# Patient Record
Sex: FEMALE | Race: WHITE | URBAN - METROPOLITAN AREA
[De-identification: names, ages, dates, MRNs, and addresses within clinical notes are randomized per-mention and may not be internally consistent; named-entity substitution may affect disease eponyms.]

---

## 2020-11-04 ENCOUNTER — OFFICE VISIT (OUTPATIENT)
Dept: URBAN - METROPOLITAN AREA CLINIC 68 | Facility: CLINIC | Age: 55
End: 2020-11-04

## 2020-11-23 ENCOUNTER — OFFICE VISIT (OUTPATIENT)
Dept: URBAN - METROPOLITAN AREA CLINIC 68 | Facility: CLINIC | Age: 55
End: 2020-11-23

## 2020-12-03 ENCOUNTER — TELEPHONE ENCOUNTER (OUTPATIENT)
Dept: URBAN - METROPOLITAN AREA CLINIC 68 | Facility: CLINIC | Age: 55
End: 2020-12-03

## 2020-12-09 ENCOUNTER — OFFICE VISIT (OUTPATIENT)
Dept: URBAN - METROPOLITAN AREA SURGERY CENTER 12 | Facility: SURGERY CENTER | Age: 55
End: 2020-12-09

## 2020-12-10 ENCOUNTER — OFFICE VISIT (OUTPATIENT)
Dept: URBAN - METROPOLITAN AREA SURGERY CENTER 12 | Facility: SURGERY CENTER | Age: 55
End: 2020-12-10

## 2020-12-15 ENCOUNTER — OFFICE VISIT (OUTPATIENT)
Dept: URBAN - METROPOLITAN AREA CLINIC 68 | Facility: CLINIC | Age: 55
End: 2020-12-15

## 2020-12-24 ENCOUNTER — OFFICE VISIT (OUTPATIENT)
Dept: URBAN - METROPOLITAN AREA SURGERY CENTER 12 | Facility: SURGERY CENTER | Age: 55
End: 2020-12-24

## 2020-12-28 LAB
01: (no result)
01: (no result)

## 2020-12-30 ENCOUNTER — TELEPHONE ENCOUNTER (OUTPATIENT)
Dept: URBAN - METROPOLITAN AREA CLINIC 68 | Facility: CLINIC | Age: 55
End: 2020-12-30

## 2020-12-30 ENCOUNTER — LAB OUTSIDE AN ENCOUNTER (OUTPATIENT)
Dept: URBAN - METROPOLITAN AREA CLINIC 68 | Facility: CLINIC | Age: 55
End: 2020-12-30

## 2021-02-22 ENCOUNTER — TELEPHONE ENCOUNTER (OUTPATIENT)
Dept: URBAN - METROPOLITAN AREA CLINIC 68 | Facility: CLINIC | Age: 56
End: 2021-02-22

## 2022-06-04 ENCOUNTER — TELEPHONE ENCOUNTER (OUTPATIENT)
Dept: URBAN - METROPOLITAN AREA CLINIC 68 | Facility: CLINIC | Age: 57
End: 2022-06-04

## 2022-06-04 RX ORDER — ALBUTEROL SULFATE 90 UG/1
ALBUTEROL SULFATE HFA( 108 (90 BASE)MCG/ACT INHALATION  AS NEEDED ) INACTIVE -HX ENTRY AEROSOL, METERED RESPIRATORY (INHALATION) AS NEEDED
OUTPATIENT
Start: 2019-09-26

## 2022-06-04 RX ORDER — POLYETHYLENE GLYCOL 3350 17 G/DOSE
POWDER (GRAM) ORAL
Qty: 1 | Refills: 0 | OUTPATIENT
Start: 2018-04-10 | End: 2018-04-24

## 2022-06-04 RX ORDER — CLOPIDOGREL BISULFATE 75 MG
PLAVIX( 75MG ORAL  DAILY ) INACTIVE -HX ENTRY TABLET ORAL DAILY
OUTPATIENT
Start: 2019-03-06

## 2022-06-04 RX ORDER — LIRAGLUTIDE 6 MG/ML
SAXENDA( 18MG/3ML SUBCUTANEOUS  AS DIRECTED ) INACTIVE -HX ENTRY INJECTION, SOLUTION SUBCUTANEOUS AS DIRECTED
OUTPATIENT
Start: 2019-09-26

## 2022-06-04 RX ORDER — POLYETHYLENE GLYCOL 3350, SODIUM SULFATE ANHYDROUS, SODIUM BICARBONATE, SODIUM CHLORIDE, POTASSIUM CHLORIDE 236; 22.74; 6.74; 5.86; 2.97 G/4L; G/4L; G/4L; G/4L; G/4L
POWDER, FOR SOLUTION ORAL ONCE
Qty: 1 | Refills: 0 | OUTPATIENT
Start: 2018-04-10 | End: 2018-04-11

## 2022-06-04 RX ORDER — CLOPIDOGREL BISULFATE 75 MG
PLAVIX( 75MG ORAL 1 DAILY ) INACTIVE -HX ENTRY TABLET ORAL DAILY
OUTPATIENT
Start: 2019-09-26

## 2022-06-04 RX ORDER — CARVEDILOL 25 MG/1
CARVEDILOL( 25MG ORAL  BID ) INACTIVE -HX ENTRY TABLET, FILM COATED ORAL BID
OUTPATIENT
Start: 2019-03-06

## 2022-06-04 RX ORDER — RIFAXIMIN 550 MG/1
TABLET ORAL
Qty: 42 | Refills: 0 | OUTPATIENT
Start: 2020-12-15 | End: 2020-12-29

## 2022-06-04 RX ORDER — DOXYCYCLINE 100 MG/10ML
DOXYCYCLINE HYCLATE( 100MG INTRAVENOUS  DAILY ) INACTIVE -HX ENTRY INJECTION, POWDER, LYOPHILIZED, FOR SOLUTION INTRAVENOUS DAILY
OUTPATIENT
Start: 2014-02-03

## 2022-06-04 RX ORDER — SODIUM SULFATE, POTASSIUM SULFATE, MAGNESIUM SULFATE 17.5; 3.13; 1.6 G/ML; G/ML; G/ML
SOLUTION, CONCENTRATE ORAL AS DIRECTED
Qty: 1 | Refills: 0 | OUTPATIENT
Start: 2020-12-15 | End: 2020-12-16

## 2022-06-04 RX ORDER — PROMETHAZINE HYDROCHLORIDE 25 MG/1
TABLET ORAL
Qty: 20 | Refills: 0 | OUTPATIENT
Start: 2018-04-10 | End: 2018-04-15

## 2022-06-04 RX ORDER — ESTRADIOL 0.05 MG/D
ESTRADIOL( 0.05MG/24HR TRANSDERMAL  TWO TIMES A WEEK ) INACTIVE -HX ENTRY PATCH TRANSDERMAL
OUTPATIENT
Start: 2018-04-10

## 2022-06-04 RX ORDER — LINACLOTIDE 72 UG/1
CAPSULE, GELATIN COATED ORAL DAILY
Qty: 14 | Refills: 0 | OUTPATIENT
Start: 2019-03-07 | End: 2019-03-21

## 2022-06-04 RX ORDER — GABAPENTIN 300 MG/1
GABAPENTIN( 300MG ORAL  THREE TIMES DAILY ) INACTIVE -HX ENTRY CAPSULE ORAL
OUTPATIENT
Start: 2018-04-10

## 2022-06-04 RX ORDER — LISINOPRIL 5 MG/1
LISINOPRIL( 5MG ORAL  1/2 TAB ) INACTIVE -HX ENTRY TABLET ORAL
OUTPATIENT
Start: 2019-03-06

## 2022-06-04 RX ORDER — FAMOTIDINE 20 MG/1
FAMOTIDINE( 20MG ORAL 1 DAILY ) INACTIVE -HX ENTRY TABLET, FILM COATED ORAL DAILY
OUTPATIENT
Start: 2019-09-26

## 2022-06-05 ENCOUNTER — TELEPHONE ENCOUNTER (OUTPATIENT)
Dept: URBAN - METROPOLITAN AREA CLINIC 68 | Facility: CLINIC | Age: 57
End: 2022-06-05

## 2022-06-05 RX ORDER — PAROXETINE HYDROCHLORIDE 10 MG/1
PAROXETINE HCL( 10MG ORAL 1 DAILY ) ACTIVE -HX ENTRY TABLET ORAL DAILY
Status: ACTIVE | COMMUNITY
Start: 2020-12-15

## 2022-06-05 RX ORDER — ROSUVASTATIN CALCIUM 5 MG/1
ROSUVASTATIN CALCIUM( 5MG ORAL 1 DAILY ) ACTIVE -HX ENTRY TABLET, FILM COATED ORAL DAILY
Status: ACTIVE | COMMUNITY
Start: 2020-12-15

## 2022-06-05 RX ORDER — MONTELUKAST SODIUM 10 MG/1
SINGULAIR( 10MG ORAL  DAILY ) ACTIVE -HX ENTRY TABLET, FILM COATED ORAL DAILY
Status: ACTIVE | COMMUNITY
Start: 2020-12-15

## 2022-06-05 RX ORDER — SILODOSIN 8 MG/1
RAPAFLO( 8MG ORAL   ) ACTIVE -HX ENTRY CAPSULE ORAL
Status: ACTIVE | COMMUNITY
Start: 2020-12-15

## 2022-06-05 RX ORDER — ETHAMBUTOL HYDROCHLORIDE 400 MG/1
ETHAMBUTOL HCL( 400MG ORAL  DAILY ) ACTIVE -HX ENTRY TABLET ORAL DAILY
Status: ACTIVE | COMMUNITY
Start: 2020-12-15

## 2022-06-05 RX ORDER — AZITHROMYCIN 500 MG/1
AZITHROMYCIN( 500MG ORAL   ) ACTIVE -HX ENTRY TABLET, FILM COATED ORAL
Status: ACTIVE | COMMUNITY
Start: 2020-12-15

## 2022-06-05 RX ORDER — PANTOPRAZOLE SODIUM 40 MG/1
TABLET, DELAYED RELEASE ORAL DAILY
Qty: 90 | Refills: 90 | Status: ACTIVE | COMMUNITY
Start: 2020-11-13

## 2022-06-05 RX ORDER — OLMESARTAN MEDOXOMIL 5 MG/1
OLMESARTAN MEDOXOMIL( 5MG ORAL 1 DAILY ) ACTIVE -HX ENTRY TABLET, FILM COATED ORAL DAILY
Status: ACTIVE | COMMUNITY
Start: 2020-12-15

## 2022-06-05 RX ORDER — RIFAMPIN 300 MG/1
RIFAMPIN( 300MG ORAL  DAILY ) ACTIVE -HX ENTRY CAPSULE ORAL DAILY
Status: ACTIVE | COMMUNITY
Start: 2020-12-15

## 2022-06-05 RX ORDER — MONTELUKAST SODIUM 10 MG/1
MONTELUKAST SODIUM( 10MG ORAL  DAILY ) ACTIVE -HX ENTRY TABLET, FILM COATED ORAL DAILY
Status: ACTIVE | COMMUNITY
Start: 2020-12-15

## 2022-06-05 RX ORDER — TIOTROPIUM BROMIDE 18 UG/1
SPIRIVA HANDIHALER( 18MCG INHALATION  AS DIRECTED ) ACTIVE -HX ENTRY CAPSULE ORAL; RESPIRATORY (INHALATION) AS DIRECTED
Status: ACTIVE | COMMUNITY
Start: 2020-12-15

## 2022-06-05 RX ORDER — FLUTICASONE FUROATE 100 UG/1
ARNUITY ELLIPTA( 100MCG/ACT INHALATION  AS DIRECTED ) ACTIVE -HX ENTRY POWDER RESPIRATORY (INHALATION) AS DIRECTED
Status: ACTIVE | COMMUNITY
Start: 2020-12-15

## 2022-06-05 RX ORDER — OMEPRAZOLE 20 MG/1
OMEPRAZOLE( 20MG ORAL  DAILY ) ACTIVE -HX ENTRY CAPSULE, DELAYED RELEASE ORAL DAILY
Status: ACTIVE | COMMUNITY
Start: 2020-12-15

## 2022-06-05 RX ORDER — ALBUTEROL SULFATE 90 UG/1
PROAIR HFA( 108 (90 BASE)MCG/ACT INHALATION  TWO PUFFS DAILY PRN ) ACTIVE -HX ENTRY AEROSOL, METERED RESPIRATORY (INHALATION)
Status: ACTIVE | COMMUNITY
Start: 2020-12-15

## 2022-06-05 RX ORDER — BUSPIRONE HYDROCHLORIDE 5 MG/1
BUSPIRONE HCL( 5MG ORAL   ) ACTIVE -HX ENTRY TABLET ORAL
Status: ACTIVE | COMMUNITY
Start: 2020-12-15

## 2022-06-05 RX ORDER — METFORMIN HYDROCHLORIDE 500 MG/1
METFORMIN HCL( 500MG ORAL   ) ACTIVE -HX ENTRY TABLET, COATED ORAL
Status: ACTIVE | COMMUNITY
Start: 2020-12-15

## 2022-06-05 RX ORDER — PIOGLITAZONE 15 MG/1
PIOGLITAZONE HCL( 15MG ORAL   ) ACTIVE -HX ENTRY TABLET ORAL
Status: ACTIVE | COMMUNITY
Start: 2020-12-15

## 2022-06-05 RX ORDER — NITROGLYCERIN 0.4 MG/1
NITROGLYCERIN( 0.4MG SUBLINGUAL  AS NEEDED ) ACTIVE -HX ENTRY TABLET SUBLINGUAL AS NEEDED
Status: ACTIVE | COMMUNITY
Start: 2020-12-15

## 2022-06-05 RX ORDER — VERAPAMIL HYDROCHLORIDE 80 MG/1
VERAPAMIL HCL( 80MG ORAL  DAILY ) ACTIVE -HX ENTRY TABLET, FILM COATED ORAL DAILY
Status: ACTIVE | COMMUNITY
Start: 2020-12-15

## 2022-06-05 RX ORDER — LISINOPRIL 10 MG/1
LISINOPRIL( 10MG ORAL 1 DAILY ) ACTIVE -HX ENTRY TABLET ORAL DAILY
Status: ACTIVE | COMMUNITY
Start: 2020-12-15

## 2022-06-05 RX ORDER — NITROGLYCERIN 0.1 MG/H
NITROGLYCERIN( 0.1MG/HR TRANSDERMAL  AS NEEDED ) ACTIVE -HX ENTRY PATCH TRANSDERMAL AS NEEDED
Status: ACTIVE | COMMUNITY
Start: 2020-12-15

## 2022-06-05 RX ORDER — ROSUVASTATIN CALCIUM 5 MG
CRESTOR( 5MG ORAL  DAILY ) ACTIVE -HX ENTRY TABLET ORAL DAILY
Status: ACTIVE | COMMUNITY
Start: 2020-12-15

## 2022-06-05 RX ORDER — COLESEVELAM HYDROCHLORIDE 625 MG/1
TABLET, FILM COATED ORAL
Qty: 30 | Refills: 30 | Status: ACTIVE | COMMUNITY
Start: 2020-12-30

## 2022-06-25 ENCOUNTER — TELEPHONE ENCOUNTER (OUTPATIENT)
Age: 57
End: 2022-06-25

## 2022-06-25 RX ORDER — PROMETHAZINE HYDROCHLORIDE 25 MG/1
TABLET ORAL
Qty: 20 | Refills: 0 | OUTPATIENT
Start: 2018-04-10 | End: 2018-04-15

## 2022-06-25 RX ORDER — ALBUTEROL SULFATE 90 UG/1
ALBUTEROL SULFATE HFA( 108 (90 BASE)MCG/ACT INHALATION  AS NEEDED ) INACTIVE -HX ENTRY INHALANT RESPIRATORY (INHALATION) AS NEEDED
OUTPATIENT
Start: 2019-09-26

## 2022-06-25 RX ORDER — METHYLCELLULOSE 500 MG/1
TABLET ORAL DAILY
Qty: 30 | Refills: 0 | OUTPATIENT
Start: 2014-02-03 | End: 2014-03-05

## 2022-06-25 RX ORDER — FAMOTIDINE 20 MG/1
FAMOTIDINE( 20MG ORAL 1 DAILY ) INACTIVE -HX ENTRY TABLET ORAL DAILY
OUTPATIENT
Start: 2019-09-26

## 2022-06-25 RX ORDER — ESTRADIOL 0.05 MG/D
ESTRADIOL( 0.05MG/24HR TRANSDERMAL  TWO TIMES A WEEK ) INACTIVE -HX ENTRY PATCH TRANSDERMAL
OUTPATIENT
Start: 2018-04-10

## 2022-06-25 RX ORDER — POLYETHYLENE GLYCOL 3350, SODIUM SULFATE ANHYDROUS, SODIUM BICARBONATE, SODIUM CHLORIDE, POTASSIUM CHLORIDE 236; 22.74; 6.74; 5.86; 2.97 G/4L; G/4L; G/4L; G/4L; G/4L
POWDER, FOR SOLUTION ORAL ONCE
Qty: 1 | Refills: 0 | OUTPATIENT
Start: 2018-04-10 | End: 2018-04-11

## 2022-06-25 RX ORDER — CLOPIDOGREL BISULFATE 75 MG
PLAVIX( 75MG ORAL 1 DAILY ) INACTIVE -HX ENTRY TABLET ORAL DAILY
OUTPATIENT
Start: 2019-09-26

## 2022-06-25 RX ORDER — RIFAXIMIN 550 MG/1
TABLET ORAL
Qty: 42 | Refills: 0 | OUTPATIENT
Start: 2020-12-15 | End: 2020-12-29

## 2022-06-25 RX ORDER — LORATADINE 5 MG
TABLET,CHEWABLE ORAL
Qty: 1 | Refills: 0 | OUTPATIENT
Start: 2018-04-10 | End: 2018-04-24

## 2022-06-25 RX ORDER — LIRAGLUTIDE 6 MG/ML
SAXENDA( 18MG/3ML SUBCUTANEOUS  AS DIRECTED ) INACTIVE -HX ENTRY INJECTION, SOLUTION SUBCUTANEOUS AS DIRECTED
OUTPATIENT
Start: 2019-09-26

## 2022-06-25 RX ORDER — LISINOPRIL 5 MG/1
LISINOPRIL( 5MG ORAL  1/2 TAB ) INACTIVE -HX ENTRY TABLET ORAL
OUTPATIENT
Start: 2019-03-06

## 2022-06-25 RX ORDER — SODIUM SULFATE, POTASSIUM SULFATE, MAGNESIUM SULFATE 17.5; 3.13; 1.6 G/ML; G/ML; G/ML
SOLUTION, CONCENTRATE ORAL AS DIRECTED
Qty: 1 | Refills: 0 | OUTPATIENT
Start: 2020-12-15 | End: 2020-12-16

## 2022-06-25 RX ORDER — DOXYCYCLINE 100 MG/10ML
DOXYCYCLINE HYCLATE( 100MG INTRAVENOUS  DAILY ) INACTIVE -HX ENTRY INJECTION, POWDER, LYOPHILIZED, FOR SOLUTION INTRAVENOUS DAILY
OUTPATIENT
Start: 2014-02-03

## 2022-06-25 RX ORDER — GABAPENTIN 300 MG/1
GABAPENTIN( 300MG ORAL  THREE TIMES DAILY ) INACTIVE -HX ENTRY CAPSULE ORAL
OUTPATIENT
Start: 2018-04-10

## 2022-06-25 RX ORDER — CLOPIDOGREL BISULFATE 75 MG
PLAVIX( 75MG ORAL  DAILY ) INACTIVE -HX ENTRY TABLET ORAL DAILY
OUTPATIENT
Start: 2019-03-06

## 2022-06-25 RX ORDER — LINACLOTIDE 72 UG/1
CAPSULE, GELATIN COATED ORAL DAILY
Qty: 14 | Refills: 0 | OUTPATIENT
Start: 2019-03-07 | End: 2019-03-21

## 2022-06-25 RX ORDER — CARVEDILOL 25 MG/1
CARVEDILOL( 25MG ORAL  BID ) INACTIVE -HX ENTRY TABLET, FILM COATED ORAL BID
OUTPATIENT
Start: 2019-03-06

## 2022-06-26 ENCOUNTER — TELEPHONE ENCOUNTER (OUTPATIENT)
Age: 57
End: 2022-06-26

## 2022-06-26 RX ORDER — AZITHROMYCIN DIHYDRATE 500 MG/1
AZITHROMYCIN( 500MG ORAL   ) ACTIVE -HX ENTRY TABLET, FILM COATED ORAL
Status: ACTIVE | COMMUNITY
Start: 2020-12-15

## 2022-06-26 RX ORDER — ASPIRIN 81 MG/1
LOW-DOSE ASPIRIN( 81MG ORAL  DAILY ) ACTIVE -HX ENTRY TABLET, COATED ORAL DAILY
Status: ACTIVE | COMMUNITY
Start: 2020-12-15

## 2022-06-26 RX ORDER — LISINOPRIL 10 MG/1
LISINOPRIL( 10MG ORAL 1 DAILY ) ACTIVE -HX ENTRY TABLET ORAL DAILY
Status: ACTIVE | COMMUNITY
Start: 2020-12-15

## 2022-06-26 RX ORDER — METFORMIN HCL 500 MG/1
METFORMIN HCL( 500MG ORAL   ) ACTIVE -HX ENTRY TABLET ORAL
Status: ACTIVE | COMMUNITY
Start: 2020-12-15

## 2022-06-26 RX ORDER — OLMESARTAN MEDOXOMIL 5 MG/1
OLMESARTAN MEDOXOMIL( 5MG ORAL 1 DAILY ) ACTIVE -HX ENTRY TABLET, FILM COATED ORAL DAILY
Status: ACTIVE | COMMUNITY
Start: 2020-12-15

## 2022-06-26 RX ORDER — PANTOPRAZOLE 40 MG/1
TABLET, DELAYED RELEASE ORAL DAILY
Qty: 90 | Refills: 90 | Status: ACTIVE | COMMUNITY
Start: 2020-11-13

## 2022-06-26 RX ORDER — TIOTROPIUM BROMIDE 18 UG/1
SPIRIVA HANDIHALER( 18MCG INHALATION  AS DIRECTED ) ACTIVE -HX ENTRY CAPSULE ORAL; RESPIRATORY (INHALATION) AS DIRECTED
Status: ACTIVE | COMMUNITY
Start: 2020-12-15

## 2022-06-26 RX ORDER — COLESEVELAM HYDROCHLORIDE 625 MG/1
TABLET, COATED ORAL
Qty: 30 | Refills: 30 | Status: ACTIVE | COMMUNITY
Start: 2020-12-30

## 2022-06-26 RX ORDER — VERAPAMIL HCL 80 MG
VERAPAMIL HCL( 80MG ORAL  DAILY ) ACTIVE -HX ENTRY TABLET ORAL DAILY
Status: ACTIVE | COMMUNITY
Start: 2020-12-15

## 2022-06-26 RX ORDER — PAROXETINE HYDROCHLORIDE 10 MG/1
PAROXETINE HCL( 10MG ORAL 1 DAILY ) ACTIVE -HX ENTRY TABLET ORAL DAILY
Status: ACTIVE | COMMUNITY
Start: 2020-12-15

## 2022-06-26 RX ORDER — NITROGLYCERIN 0.4 MG/1
NITROGLYCERIN( 0.4MG SUBLINGUAL  AS NEEDED ) ACTIVE -HX ENTRY TABLET SUBLINGUAL AS NEEDED
Status: ACTIVE | COMMUNITY
Start: 2020-12-15

## 2022-06-26 RX ORDER — ROSUVASTATIN CALCIUM 5 MG
CRESTOR( 5MG ORAL  DAILY ) ACTIVE -HX ENTRY TABLET ORAL DAILY
Status: ACTIVE | COMMUNITY
Start: 2020-12-15

## 2022-06-26 RX ORDER — DICLOFENAC SODIUM 1 %
VOLTAREN( 1% EXTERNAL  AS NEEDED ) ACTIVE -HX ENTRY GEL (GRAM) TOPICAL AS NEEDED
Status: ACTIVE | COMMUNITY
Start: 2020-12-15

## 2022-06-26 RX ORDER — FLUTICASONE FUROATE 100 UG/1
ARNUITY ELLIPTA( 100MCG/ACT INHALATION  AS DIRECTED ) ACTIVE -HX ENTRY POWDER RESPIRATORY (INHALATION) AS DIRECTED
Status: ACTIVE | COMMUNITY
Start: 2020-12-15

## 2022-06-26 RX ORDER — BUSPIRONE HYDROCHLORIDE 5 MG/1
BUSPIRONE HCL( 5MG ORAL   ) ACTIVE -HX ENTRY TABLET ORAL
Status: ACTIVE | COMMUNITY
Start: 2020-12-15

## 2022-06-26 RX ORDER — PIOGLITAZONE 15 MG/1
PIOGLITAZONE HCL( 15MG ORAL   ) ACTIVE -HX ENTRY TABLET ORAL
Status: ACTIVE | COMMUNITY
Start: 2020-12-15

## 2022-06-26 RX ORDER — MONTELUKAST SODIUM 10 MG/1
SINGULAIR( 10MG ORAL  DAILY ) ACTIVE -HX ENTRY TABLET, FILM COATED ORAL DAILY
Status: ACTIVE | COMMUNITY
Start: 2020-12-15

## 2022-06-26 RX ORDER — NITROGLYCERIN 20 MG/1
NITROGLYCERIN( 0.1MG/HR TRANSDERMAL  AS NEEDED ) ACTIVE -HX ENTRY PATCH TRANSDERMAL AS NEEDED
Status: ACTIVE | COMMUNITY
Start: 2020-12-15

## 2022-06-26 RX ORDER — ROSUVASTATIN CALCIUM 5 MG/1
ROSUVASTATIN CALCIUM( 5MG ORAL 1 DAILY ) ACTIVE -HX ENTRY TABLET, FILM COATED ORAL DAILY
Status: ACTIVE | COMMUNITY
Start: 2020-12-15

## 2022-06-26 RX ORDER — RIFAMPIN 300 MG/1
RIFAMPIN( 300MG ORAL  DAILY ) ACTIVE -HX ENTRY CAPSULE ORAL DAILY
Status: ACTIVE | COMMUNITY
Start: 2020-12-15

## 2022-06-26 RX ORDER — VALSARTAN 80 MG/1
VALSARTAN( 80MG ORAL  DAILY ) ACTIVE -HX ENTRY TABLET, COATED ORAL DAILY
Status: ACTIVE | COMMUNITY
Start: 2020-12-15

## 2022-06-26 RX ORDER — SILODOSIN 8 MG/1
RAPAFLO( 8MG ORAL   ) ACTIVE -HX ENTRY CAPSULE ORAL
Status: ACTIVE | COMMUNITY
Start: 2020-12-15

## 2022-06-26 RX ORDER — OMEPRAZOLE 20 MG/1
OMEPRAZOLE( 20MG ORAL  DAILY ) ACTIVE -HX ENTRY CAPSULE, DELAYED RELEASE ORAL DAILY
Status: ACTIVE | COMMUNITY
Start: 2020-12-15

## 2022-06-26 RX ORDER — ETHAMBUTOL HYDROCHLORIDE 400 MG/1
ETHAMBUTOL HCL( 400MG ORAL  DAILY ) ACTIVE -HX ENTRY TABLET ORAL DAILY
Status: ACTIVE | COMMUNITY
Start: 2020-12-15

## 2022-06-26 RX ORDER — MONTELUKAST 10 MG/1
MONTELUKAST SODIUM( 10MG ORAL  DAILY ) ACTIVE -HX ENTRY TABLET, FILM COATED ORAL DAILY
Status: ACTIVE | COMMUNITY
Start: 2020-12-15

## 2022-07-09 ENCOUNTER — TELEPHONE ENCOUNTER (OUTPATIENT)
Dept: URBAN - METROPOLITAN AREA CLINIC 121 | Facility: CLINIC | Age: 57
End: 2022-07-09

## 2022-07-10 ENCOUNTER — TELEPHONE ENCOUNTER (OUTPATIENT)
Dept: URBAN - METROPOLITAN AREA CLINIC 121 | Facility: CLINIC | Age: 57
End: 2022-07-10

## 2023-11-07 ENCOUNTER — LAB OUTSIDE AN ENCOUNTER (OUTPATIENT)
Dept: URBAN - METROPOLITAN AREA CLINIC 66 | Facility: CLINIC | Age: 58
End: 2023-11-07

## 2023-11-07 ENCOUNTER — OFFICE VISIT (OUTPATIENT)
Dept: URBAN - METROPOLITAN AREA CLINIC 66 | Facility: CLINIC | Age: 58
End: 2023-11-07
Payer: COMMERCIAL

## 2023-11-07 VITALS
SYSTOLIC BLOOD PRESSURE: 114 MMHG | HEIGHT: 65 IN | WEIGHT: 178 LBS | BODY MASS INDEX: 29.66 KG/M2 | OXYGEN SATURATION: 99 % | HEART RATE: 62 BPM | DIASTOLIC BLOOD PRESSURE: 70 MMHG | TEMPERATURE: 98.1 F

## 2023-11-07 DIAGNOSIS — Z86.010 HISTORY OF COLON POLYPS: ICD-10-CM

## 2023-11-07 DIAGNOSIS — K58.0 IRRITABLE BOWEL SYNDROME WITH DIARRHEA: ICD-10-CM

## 2023-11-07 DIAGNOSIS — R19.7 ACUTE DIARRHEA: ICD-10-CM

## 2023-11-07 DIAGNOSIS — K57.30 DIVERTICULOSIS OF COLON: ICD-10-CM

## 2023-11-07 DIAGNOSIS — R10.33 PERIUMBILICAL ABDOMINAL PAIN: ICD-10-CM

## 2023-11-07 DIAGNOSIS — R12 HEARTBURN: ICD-10-CM

## 2023-11-07 DIAGNOSIS — Z80.0 FH: COLON CANCER: ICD-10-CM

## 2023-11-07 DIAGNOSIS — K29.30 CHRONIC SUPERFICIAL GASTRITIS WITHOUT BLEEDING: ICD-10-CM

## 2023-11-07 PROCEDURE — 99214 OFFICE O/P EST MOD 30 MIN: CPT | Performed by: INTERNAL MEDICINE

## 2023-11-07 RX ORDER — TRAMADOL HYDROCHLORIDE 50 MG/1
TAKE ONE TABLET BY MOUTH EVERY 6 HOURS AS NEEDED FOR SEVERE PAIN (7-10) FOR UP TO THREE DAYS TABLET, FILM COATED ORAL
Qty: 12 UNSPECIFIED | Refills: 0 | Status: ACTIVE | COMMUNITY

## 2023-11-07 RX ORDER — METOPROLOL SUCCINATE 50 MG/1
TAKE ONE TABLET BY MOUTH ONE TIME DAILY DO NOT CRUSH OR CHEW TABLET, EXTENDED RELEASE ORAL
Qty: 90 UNSPECIFIED | Refills: 3 | Status: ACTIVE | COMMUNITY

## 2023-11-07 RX ORDER — ASPIRIN 81 MG/1
LOW-DOSE ASPIRIN( 81MG ORAL  DAILY ) ACTIVE -HX ENTRY TABLET, COATED ORAL DAILY
Status: ACTIVE | COMMUNITY
Start: 2020-12-15

## 2023-11-07 RX ORDER — ALPRAZOLAM 0.5 MG/1
TAKE ONE TABLET BY MOUTH EVERY 8 HOURS AS NEEDED TABLET ORAL
Qty: 90 UNSPECIFIED | Refills: 2 | Status: ACTIVE | COMMUNITY

## 2023-11-07 RX ORDER — ROSUVASTATIN CALCIUM 5 MG/1
ROSUVASTATIN CALCIUM( 5MG ORAL 1 DAILY ) ACTIVE -HX ENTRY TABLET, FILM COATED ORAL DAILY
Status: ACTIVE | COMMUNITY
Start: 2020-12-15

## 2023-11-07 RX ORDER — MONTELUKAST 10 MG/1
MONTELUKAST SODIUM( 10MG ORAL  DAILY ) ACTIVE -HX ENTRY TABLET, FILM COATED ORAL DAILY
Status: ACTIVE | COMMUNITY
Start: 2020-12-15

## 2023-11-07 RX ORDER — NITROGLYCERIN 0.4 MG/1
NITROGLYCERIN( 0.4MG SUBLINGUAL  AS NEEDED ) ACTIVE -HX ENTRY TABLET SUBLINGUAL AS NEEDED
Status: ACTIVE | COMMUNITY
Start: 2020-12-15

## 2023-11-07 RX ORDER — BUDESONIDE, GLYCOPYRROLATE, AND FORMOTEROL FUMARATE 160; 9; 4.8 UG/1; UG/1; UG/1
INHALE TWO PUFFS BY MOUTH TWICE A DAY AS DIRECTED AEROSOL, METERED RESPIRATORY (INHALATION)
Qty: 10.7 UNSPECIFIED | Refills: 0 | Status: ACTIVE | COMMUNITY

## 2023-11-07 NOTE — HPI-TODAY'S VISIT:
Patient evaluated that had recent pelvic surgery (late Sept 2023) with subsequent encarcerated umbilical hernia that required stat surgery.  REferred had post surgical constipation problems that has improved but not compleately resolved.  Referred having intermittent diarrhea. Referred having intermittent episodes of pain.  Denies nausea, vomits, dysphagia, odynophagia, heartburn, abdominal pain, GI bleeding or weight loss

## 2023-11-08 LAB
A/G RATIO: 2.1
ABSOLUTE BASOPHILS: 21
ABSOLUTE EOSINOPHILS: 21
ABSOLUTE LYMPHOCYTES: 707
ABSOLUTE MONOCYTES: 260
ABSOLUTE NEUTROPHILS: 9391
ALBUMIN: 4.6
ALKALINE PHOSPHATASE: 56
ALT (SGPT): 11
AST (SGOT): 16
BASOPHILS: 0.2
BILIRUBIN, TOTAL: 0.6
BUN/CREATININE RATIO: (no result)
BUN: 20
CALCIUM: 10
CARBON DIOXIDE, TOTAL: 31
CHLORIDE: 102
CREATININE: 0.59
EGFR: 104
EOSINOPHILS: 0.2
GLOBULIN, TOTAL: 2.2
GLUCOSE: 82
HEMATOCRIT: 42.7
HEMOGLOBIN: 14.2
LYMPHOCYTES: 6.8
MCH: 28.5
MCHC: 33.3
MCV: 85.6
MONOCYTES: 2.5
MPV: 9.1
NEUTROPHILS: 90.3
PLATELET COUNT: 311
POTASSIUM: 4.4
PROTEIN, TOTAL: 6.8
RDW: 13.3
RED BLOOD CELL COUNT: 4.99
SODIUM: 141
WHITE BLOOD CELL COUNT: 10.4

## 2023-11-10 ENCOUNTER — TELEPHONE ENCOUNTER (OUTPATIENT)
Dept: URBAN - METROPOLITAN AREA CLINIC 68 | Facility: CLINIC | Age: 58
End: 2023-11-10

## 2023-11-10 RX ORDER — ONDANSETRON 4 MG/1
1 TABLET ON THE TONGUE AND ALLOW TO DISSOLVE TABLET, ORALLY DISINTEGRATING ORAL
Qty: 30 TABLET | Refills: 0 | OUTPATIENT
Start: 2023-11-10

## 2023-11-13 ENCOUNTER — TELEPHONE ENCOUNTER (OUTPATIENT)
Dept: URBAN - METROPOLITAN AREA CLINIC 68 | Facility: CLINIC | Age: 58
End: 2023-11-13

## 2023-11-14 LAB — CLOSTRIDIUM DIFFICILE TOXINB,QL REAL TIME PCR: NOT DETECTED

## 2023-12-01 ENCOUNTER — OFFICE VISIT (OUTPATIENT)
Dept: URBAN - METROPOLITAN AREA CLINIC 68 | Facility: CLINIC | Age: 58
End: 2023-12-01
Payer: COMMERCIAL

## 2023-12-01 ENCOUNTER — LAB OUTSIDE AN ENCOUNTER (OUTPATIENT)
Dept: URBAN - METROPOLITAN AREA CLINIC 68 | Facility: CLINIC | Age: 58
End: 2023-12-01

## 2023-12-01 VITALS
SYSTOLIC BLOOD PRESSURE: 132 MMHG | HEIGHT: 65 IN | DIASTOLIC BLOOD PRESSURE: 80 MMHG | WEIGHT: 179 LBS | BODY MASS INDEX: 29.82 KG/M2

## 2023-12-01 DIAGNOSIS — K58.0 IRRITABLE BOWEL SYNDROME WITH DIARRHEA: ICD-10-CM

## 2023-12-01 DIAGNOSIS — M79.3 PANNICULITIS: ICD-10-CM

## 2023-12-01 DIAGNOSIS — R10.33 PERIUMBILICAL ABDOMINAL PAIN: ICD-10-CM

## 2023-12-01 DIAGNOSIS — R19.7 ACUTE DIARRHEA: ICD-10-CM

## 2023-12-01 PROBLEM — 443503005: Status: ACTIVE | Noted: 2023-11-07

## 2023-12-01 PROBLEM — 409966000: Status: ACTIVE | Noted: 2023-11-07

## 2023-12-01 PROCEDURE — 99214 OFFICE O/P EST MOD 30 MIN: CPT

## 2023-12-01 RX ORDER — MONTELUKAST 10 MG/1
MONTELUKAST SODIUM( 10MG ORAL  DAILY ) ACTIVE -HX ENTRY TABLET, FILM COATED ORAL DAILY
Status: ACTIVE | COMMUNITY
Start: 2020-12-15

## 2023-12-01 RX ORDER — BUDESONIDE, GLYCOPYRROLATE, AND FORMOTEROL FUMARATE 160; 9; 4.8 UG/1; UG/1; UG/1
INHALE TWO PUFFS BY MOUTH TWICE A DAY AS DIRECTED AEROSOL, METERED RESPIRATORY (INHALATION)
Qty: 10.7 UNSPECIFIED | Refills: 0 | Status: ACTIVE | COMMUNITY

## 2023-12-01 RX ORDER — TRAMADOL HYDROCHLORIDE 50 MG/1
TAKE ONE TABLET BY MOUTH EVERY 6 HOURS AS NEEDED FOR SEVERE PAIN (7-10) FOR UP TO THREE DAYS TABLET, FILM COATED ORAL
Qty: 12 UNSPECIFIED | Refills: 0 | Status: ACTIVE | COMMUNITY

## 2023-12-01 RX ORDER — CELECOXIB 100 MG/1
1 CAPSULE WITH FOOD CAPSULE ORAL TWICE A DAY
Qty: 28 CAPSULE | Refills: 0 | OUTPATIENT
Start: 2023-12-01 | End: 2023-12-15

## 2023-12-01 RX ORDER — METOPROLOL SUCCINATE 50 MG/1
TAKE ONE TABLET BY MOUTH ONE TIME DAILY DO NOT CRUSH OR CHEW TABLET, EXTENDED RELEASE ORAL
Qty: 90 UNSPECIFIED | Refills: 3 | Status: ACTIVE | COMMUNITY

## 2023-12-01 RX ORDER — ROSUVASTATIN CALCIUM 5 MG/1
ROSUVASTATIN CALCIUM( 5MG ORAL 1 DAILY ) ACTIVE -HX ENTRY TABLET, FILM COATED ORAL DAILY
Status: ACTIVE | COMMUNITY
Start: 2020-12-15

## 2023-12-01 RX ORDER — PANTOPRAZOLE SODIUM 40 MG/1
1 TABLET 30 MINUTES BEFORE MORNING MEAL TABLET, DELAYED RELEASE ORAL ONCE A DAY
Qty: 30 | Refills: 3 | OUTPATIENT
Start: 2023-12-01

## 2023-12-01 RX ORDER — ONDANSETRON 4 MG/1
1 TABLET ON THE TONGUE AND ALLOW TO DISSOLVE TABLET, ORALLY DISINTEGRATING ORAL
Qty: 30 TABLET | Refills: 0 | Status: ACTIVE | COMMUNITY
Start: 2023-11-10

## 2023-12-01 RX ORDER — NITROGLYCERIN 0.4 MG/1
NITROGLYCERIN( 0.4MG SUBLINGUAL  AS NEEDED ) ACTIVE -HX ENTRY TABLET SUBLINGUAL AS NEEDED
Status: ACTIVE | COMMUNITY
Start: 2020-12-15

## 2023-12-01 RX ORDER — ALPRAZOLAM 0.5 MG/1
TAKE ONE TABLET BY MOUTH EVERY 8 HOURS AS NEEDED TABLET ORAL
Qty: 90 UNSPECIFIED | Refills: 2 | Status: ACTIVE | COMMUNITY

## 2023-12-01 RX ORDER — ASPIRIN 81 MG/1
LOW-DOSE ASPIRIN( 81MG ORAL  DAILY ) ACTIVE -HX ENTRY TABLET, COATED ORAL DAILY
Status: ACTIVE | COMMUNITY
Start: 2020-12-15

## 2023-12-01 NOTE — PHYSICAL EXAM GASTROINTESTINAL
Abdomen (Limited by body habitus), multiple clean, dry healing laparoscopic incisions of abdomen. Otherwise, soft, nontender, nondistended, no guarding or rigidity, no masses palpable, normal bowel sounds, Liver and Spleen, no hepatomegaly present, no hepatosplenomegaly, liver nontender, spleen not palpable

## 2023-12-01 NOTE — HPI-TODAY'S VISIT:
Patient presents for follow up of intermittent abdominal pain s/p CT Abd/Pelvis (11/14/23). Refers additionally complete blood work and stool studies. Refers continued loose to diarrheal stools with some accompanying abdominal bloating. Refers uncontrolled heartburn at this time. Refers hx of kidney injury in the past, but states no kidney dysfunction. Refers preceding plevic floor surgery (9/2023) with post-op complication of incarcerated umbilical hernia. Also did experience post-op prolonged constipation. Denies fever, chills, nausea, vomiting, dysphagia, odynophagia, GI bleeding or unintentional weight loss.

## 2024-01-03 ENCOUNTER — WEB ENCOUNTER (OUTPATIENT)
Dept: URBAN - METROPOLITAN AREA CLINIC 68 | Facility: CLINIC | Age: 59
End: 2024-01-03

## 2024-01-03 RX ORDER — CELECOXIB 100 MG/1
1 CAPSULE WITH FOOD CAPSULE ORAL TWICE A DAY
Qty: 28 CAPSULE | Refills: 0
Start: 2023-12-01 | End: 2024-01-17

## 2024-01-04 ENCOUNTER — OFFICE VISIT (OUTPATIENT)
Dept: URBAN - METROPOLITAN AREA CLINIC 67 | Facility: CLINIC | Age: 59
End: 2024-01-04

## 2024-01-10 ENCOUNTER — OFFICE VISIT (OUTPATIENT)
Dept: URBAN - METROPOLITAN AREA CLINIC 66 | Facility: CLINIC | Age: 59
End: 2024-01-10
Payer: COMMERCIAL

## 2024-01-10 ENCOUNTER — LAB OUTSIDE AN ENCOUNTER (OUTPATIENT)
Dept: URBAN - METROPOLITAN AREA CLINIC 66 | Facility: CLINIC | Age: 59
End: 2024-01-10

## 2024-01-10 VITALS
HEIGHT: 65 IN | BODY MASS INDEX: 29.66 KG/M2 | OXYGEN SATURATION: 99 % | HEART RATE: 61 BPM | SYSTOLIC BLOOD PRESSURE: 128 MMHG | DIASTOLIC BLOOD PRESSURE: 82 MMHG | WEIGHT: 178 LBS

## 2024-01-10 DIAGNOSIS — R19.5 LOOSE STOOLS: ICD-10-CM

## 2024-01-10 DIAGNOSIS — R12 HEARTBURN: ICD-10-CM

## 2024-01-10 DIAGNOSIS — K92.1 MELENA: ICD-10-CM

## 2024-01-10 DIAGNOSIS — M79.3 PANNICULITIS: ICD-10-CM

## 2024-01-10 PROBLEM — 2901004: Status: ACTIVE | Noted: 2024-01-10

## 2024-01-10 PROBLEM — 783729004: Status: ACTIVE | Noted: 2024-01-10

## 2024-01-10 PROBLEM — 398032003: Status: ACTIVE | Noted: 2024-01-10

## 2024-01-10 PROBLEM — 22125009: Status: ACTIVE | Noted: 2023-12-01

## 2024-01-10 PROBLEM — 311030311000119106: Status: ACTIVE | Noted: 2024-01-10

## 2024-01-10 PROBLEM — 732230001: Status: ACTIVE | Noted: 2024-01-10

## 2024-01-10 PROCEDURE — 99214 OFFICE O/P EST MOD 30 MIN: CPT

## 2024-01-10 RX ORDER — ONDANSETRON 4 MG/1
1 TABLET ON THE TONGUE AND ALLOW TO DISSOLVE TABLET, ORALLY DISINTEGRATING ORAL
Qty: 30 TABLET | Refills: 0 | Status: ACTIVE | COMMUNITY
Start: 2023-11-10

## 2024-01-10 RX ORDER — PANTOPRAZOLE SODIUM 40 MG/1
1 TABLET 30 MINUTES BEFORE MORNING MEAL TABLET, DELAYED RELEASE ORAL ONCE A DAY
Qty: 30 | Refills: 3 | Status: ACTIVE | COMMUNITY
Start: 2023-12-01

## 2024-01-10 RX ORDER — ALPRAZOLAM 0.5 MG/1
TAKE ONE TABLET BY MOUTH EVERY 8 HOURS AS NEEDED TABLET ORAL
Qty: 90 UNSPECIFIED | Refills: 2 | Status: ACTIVE | COMMUNITY

## 2024-01-10 RX ORDER — NITROGLYCERIN 0.4 MG/1
NITROGLYCERIN( 0.4MG SUBLINGUAL  AS NEEDED ) ACTIVE -HX ENTRY TABLET SUBLINGUAL AS NEEDED
Status: ACTIVE | COMMUNITY
Start: 2020-12-15

## 2024-01-10 RX ORDER — ASPIRIN 81 MG/1
LOW-DOSE ASPIRIN( 81MG ORAL  DAILY ) ACTIVE -HX ENTRY TABLET, COATED ORAL DAILY
Status: ACTIVE | COMMUNITY
Start: 2020-12-15

## 2024-01-10 RX ORDER — PANTOPRAZOLE SODIUM 40 MG/1
1 TABLET 30 MINUTES BEFORE MORNING MEAL TABLET, DELAYED RELEASE ORAL TWICE A DAY
Qty: 60 | Refills: 1 | OUTPATIENT
Start: 2024-01-10

## 2024-01-10 RX ORDER — BUDESONIDE, GLYCOPYRROLATE, AND FORMOTEROL FUMARATE 160; 9; 4.8 UG/1; UG/1; UG/1
INHALE TWO PUFFS BY MOUTH TWICE A DAY AS DIRECTED AEROSOL, METERED RESPIRATORY (INHALATION)
Qty: 10.7 UNSPECIFIED | Refills: 0 | Status: ACTIVE | COMMUNITY

## 2024-01-10 RX ORDER — CELECOXIB 100 MG/1
1 CAPSULE WITH FOOD CAPSULE ORAL TWICE A DAY
Qty: 28 CAPSULE | Refills: 0 | Status: ACTIVE | COMMUNITY
Start: 2023-12-01 | End: 2024-01-17

## 2024-01-10 RX ORDER — METOPROLOL SUCCINATE 50 MG/1
TAKE ONE TABLET BY MOUTH ONE TIME DAILY DO NOT CRUSH OR CHEW TABLET, EXTENDED RELEASE ORAL
Qty: 90 UNSPECIFIED | Refills: 3 | Status: ACTIVE | COMMUNITY

## 2024-01-10 RX ORDER — SUCRALFATE 1 G/1
1 TABLET ON AN EMPTY STOMACH TABLET ORAL TWICE A DAY
Qty: 60 | Refills: 1 | OUTPATIENT
Start: 2024-01-10 | End: 2024-03-10

## 2024-01-10 RX ORDER — TRAMADOL HYDROCHLORIDE 50 MG/1
TAKE ONE TABLET BY MOUTH EVERY 6 HOURS AS NEEDED FOR SEVERE PAIN (7-10) FOR UP TO THREE DAYS TABLET, FILM COATED ORAL
Qty: 12 UNSPECIFIED | Refills: 0 | Status: ACTIVE | COMMUNITY

## 2024-01-10 RX ORDER — ROSUVASTATIN CALCIUM 5 MG/1
ROSUVASTATIN CALCIUM( 5MG ORAL 1 DAILY ) ACTIVE -HX ENTRY TABLET, FILM COATED ORAL DAILY
Status: ACTIVE | COMMUNITY
Start: 2020-12-15

## 2024-01-10 RX ORDER — MONTELUKAST 10 MG/1
MONTELUKAST SODIUM( 10MG ORAL  DAILY ) ACTIVE -HX ENTRY TABLET, FILM COATED ORAL DAILY
Status: ACTIVE | COMMUNITY
Start: 2020-12-15

## 2024-01-10 NOTE — HPI-TODAY'S VISIT:
Patient presents for follow up of intermittent abdominal pain. Refer she did have recent CT Abd/Pelvis (11/14/23) showing panniculitis and subsequently completed 2 weeks therapy with celecoxib 100mg BID with significant improvement of symptoms. She did request refill as this was controlling symptoms but was advised to f/u in office for further eval. She notes some continued abdominal pain and admits to recent intermittent black stools, which she describes as significantly odorous.  Refers she was experiencing loose stools and abdominal bloating in remote past, but black stools have been more recent in nature. Refers hx of uncontrolled heartburn and was advised at last visit to start PPI once daily. Refers hx plevic floor surgery (9/2023) with post-op complication of SBO and incarcerated umbilical hernia.  Refers hx of SCAD (dx 2018) and FMD (dx 2019) and states she is followed by [facility]. Denies fever, chills, nausea, vomiting, dysphagia, odynophagia, hematochezia, or unintentional weight loss.

## 2024-02-14 ENCOUNTER — EGD (OUTPATIENT)
Dept: URBAN - METROPOLITAN AREA SURGERY CENTER 12 | Facility: SURGERY CENTER | Age: 59
End: 2024-02-14
Payer: COMMERCIAL

## 2024-02-14 ENCOUNTER — LAB (OUTPATIENT)
Dept: URBAN - METROPOLITAN AREA CLINIC 4 | Facility: CLINIC | Age: 59
End: 2024-02-14
Payer: COMMERCIAL

## 2024-02-14 DIAGNOSIS — K29.70 GASTRITIS WITHOUT BLEEDING, UNSPECIFIED CHRONICITY, UNSPECIFIED GASTRITIS TYPE: ICD-10-CM

## 2024-02-14 DIAGNOSIS — K22.89 OTHER SPECIFIED DISEASE OF ESOPHAGUS: ICD-10-CM

## 2024-02-14 DIAGNOSIS — K31.89 OTHER DISEASES OF STOMACH AND DUODENUM: ICD-10-CM

## 2024-02-14 PROCEDURE — 88305 TISSUE EXAM BY PATHOLOGIST: CPT | Performed by: PATHOLOGY

## 2024-02-14 PROCEDURE — 43239 EGD BIOPSY SINGLE/MULTIPLE: CPT | Performed by: INTERNAL MEDICINE

## 2024-02-14 RX ORDER — ONDANSETRON 4 MG/1
1 TABLET ON THE TONGUE AND ALLOW TO DISSOLVE TABLET, ORALLY DISINTEGRATING ORAL
Qty: 30 TABLET | Refills: 0 | Status: ACTIVE | COMMUNITY
Start: 2023-11-10

## 2024-02-14 RX ORDER — PANTOPRAZOLE SODIUM 40 MG/1
1 TABLET 30 MINUTES BEFORE MORNING MEAL TABLET, DELAYED RELEASE ORAL ONCE A DAY
Qty: 30 | Refills: 3 | Status: ACTIVE | COMMUNITY
Start: 2023-12-01

## 2024-02-14 RX ORDER — SUCRALFATE 1 G/1
1 TABLET ON AN EMPTY STOMACH TABLET ORAL TWICE A DAY
Qty: 60 | Refills: 1 | Status: ACTIVE | COMMUNITY
Start: 2024-01-10 | End: 2024-03-10

## 2024-02-14 RX ORDER — MONTELUKAST 10 MG/1
MONTELUKAST SODIUM( 10MG ORAL  DAILY ) ACTIVE -HX ENTRY TABLET, FILM COATED ORAL DAILY
Status: ACTIVE | COMMUNITY
Start: 2020-12-15

## 2024-02-14 RX ORDER — ROSUVASTATIN CALCIUM 5 MG/1
ROSUVASTATIN CALCIUM( 5MG ORAL 1 DAILY ) ACTIVE -HX ENTRY TABLET, FILM COATED ORAL DAILY
Status: ACTIVE | COMMUNITY
Start: 2020-12-15

## 2024-02-14 RX ORDER — PANTOPRAZOLE SODIUM 40 MG/1
1 TABLET 30 MINUTES BEFORE MORNING MEAL TABLET, DELAYED RELEASE ORAL TWICE A DAY
Qty: 60 | Refills: 1 | Status: ACTIVE | COMMUNITY
Start: 2024-01-10

## 2024-02-14 RX ORDER — BUDESONIDE, GLYCOPYRROLATE, AND FORMOTEROL FUMARATE 160; 9; 4.8 UG/1; UG/1; UG/1
INHALE TWO PUFFS BY MOUTH TWICE A DAY AS DIRECTED AEROSOL, METERED RESPIRATORY (INHALATION)
Qty: 10.7 UNSPECIFIED | Refills: 0 | Status: ACTIVE | COMMUNITY

## 2024-02-14 RX ORDER — ALPRAZOLAM 0.5 MG/1
TAKE ONE TABLET BY MOUTH EVERY 8 HOURS AS NEEDED TABLET ORAL
Qty: 90 UNSPECIFIED | Refills: 2 | Status: ACTIVE | COMMUNITY

## 2024-02-14 RX ORDER — ASPIRIN 81 MG/1
LOW-DOSE ASPIRIN( 81MG ORAL  DAILY ) ACTIVE -HX ENTRY TABLET, COATED ORAL DAILY
Status: ACTIVE | COMMUNITY
Start: 2020-12-15

## 2024-02-14 RX ORDER — METOPROLOL SUCCINATE 50 MG/1
TAKE ONE TABLET BY MOUTH ONE TIME DAILY DO NOT CRUSH OR CHEW TABLET, EXTENDED RELEASE ORAL
Qty: 90 UNSPECIFIED | Refills: 3 | Status: ACTIVE | COMMUNITY

## 2024-02-14 RX ORDER — NITROGLYCERIN 0.4 MG/1
NITROGLYCERIN( 0.4MG SUBLINGUAL  AS NEEDED ) ACTIVE -HX ENTRY TABLET SUBLINGUAL AS NEEDED
Status: ACTIVE | COMMUNITY
Start: 2020-12-15

## 2024-02-14 RX ORDER — TRAMADOL HYDROCHLORIDE 50 MG/1
TAKE ONE TABLET BY MOUTH EVERY 6 HOURS AS NEEDED FOR SEVERE PAIN (7-10) FOR UP TO THREE DAYS TABLET, FILM COATED ORAL
Qty: 12 UNSPECIFIED | Refills: 0 | Status: ACTIVE | COMMUNITY

## 2024-02-22 ENCOUNTER — ANC VISIT (OUTPATIENT)
Dept: URBAN - METROPOLITAN AREA CLINIC 67 | Facility: CLINIC | Age: 59
End: 2024-02-22
Payer: COMMERCIAL

## 2024-02-22 DIAGNOSIS — K63.8219 SMALL INTESTINAL BACTERIAL OVERGROWTH (SIBO): ICD-10-CM

## 2024-02-22 PROCEDURE — 91065 BREATH HYDROGEN/METHANE TEST: CPT | Performed by: INTERNAL MEDICINE

## 2024-03-01 ENCOUNTER — LAB (OUTPATIENT)
Dept: URBAN - METROPOLITAN AREA CLINIC 68 | Facility: CLINIC | Age: 59
End: 2024-03-01

## 2024-03-01 ENCOUNTER — OV EP (OUTPATIENT)
Dept: URBAN - METROPOLITAN AREA CLINIC 68 | Facility: CLINIC | Age: 59
End: 2024-03-01
Payer: COMMERCIAL

## 2024-03-01 VITALS
DIASTOLIC BLOOD PRESSURE: 80 MMHG | BODY MASS INDEX: 29.66 KG/M2 | OXYGEN SATURATION: 98 % | HEART RATE: 71 BPM | SYSTOLIC BLOOD PRESSURE: 118 MMHG | HEIGHT: 65 IN | WEIGHT: 178 LBS

## 2024-03-01 DIAGNOSIS — K58.0 IRRITABLE BOWEL SYNDROME WITH DIARRHEA: ICD-10-CM

## 2024-03-01 DIAGNOSIS — R68.81 EARLY SATIETY: ICD-10-CM

## 2024-03-01 DIAGNOSIS — R10.13 EPIGASTRIC PAIN: ICD-10-CM

## 2024-03-01 DIAGNOSIS — K92.1 MELENA: ICD-10-CM

## 2024-03-01 DIAGNOSIS — K63.89 SMALL INTESTINAL BACTERIAL OVERGROWTH (SIBO): ICD-10-CM

## 2024-03-01 DIAGNOSIS — M79.3 PANNICULITIS: ICD-10-CM

## 2024-03-01 DIAGNOSIS — R19.5 LOOSE STOOLS: ICD-10-CM

## 2024-03-01 DIAGNOSIS — K29.30 CHRONIC SUPERFICIAL GASTRITIS WITHOUT BLEEDING: ICD-10-CM

## 2024-03-01 PROBLEM — 79922009: Status: ACTIVE | Noted: 2024-03-01

## 2024-03-01 PROBLEM — 442076002: Status: ACTIVE | Noted: 2024-03-01

## 2024-03-01 PROCEDURE — 99214 OFFICE O/P EST MOD 30 MIN: CPT

## 2024-03-01 RX ORDER — ROSUVASTATIN CALCIUM 5 MG/1
ROSUVASTATIN CALCIUM( 5MG ORAL 1 DAILY ) ACTIVE -HX ENTRY TABLET, FILM COATED ORAL DAILY
Status: ACTIVE | COMMUNITY
Start: 2020-12-15

## 2024-03-01 RX ORDER — TRAMADOL HYDROCHLORIDE 50 MG/1
TAKE ONE TABLET BY MOUTH EVERY 6 HOURS AS NEEDED FOR SEVERE PAIN (7-10) FOR UP TO THREE DAYS TABLET, FILM COATED ORAL
Qty: 12 UNSPECIFIED | Refills: 0 | Status: ACTIVE | COMMUNITY

## 2024-03-01 RX ORDER — BUDESONIDE, GLYCOPYRROLATE, AND FORMOTEROL FUMARATE 160; 9; 4.8 UG/1; UG/1; UG/1
INHALE TWO PUFFS BY MOUTH TWICE A DAY AS DIRECTED AEROSOL, METERED RESPIRATORY (INHALATION)
Qty: 10.7 UNSPECIFIED | Refills: 0 | Status: ACTIVE | COMMUNITY

## 2024-03-01 RX ORDER — MONTELUKAST 10 MG/1
MONTELUKAST SODIUM( 10MG ORAL  DAILY ) ACTIVE -HX ENTRY TABLET, FILM COATED ORAL DAILY
Status: ACTIVE | COMMUNITY
Start: 2020-12-15

## 2024-03-01 RX ORDER — METOPROLOL SUCCINATE 50 MG/1
TAKE ONE TABLET BY MOUTH ONE TIME DAILY DO NOT CRUSH OR CHEW TABLET, EXTENDED RELEASE ORAL
Qty: 90 UNSPECIFIED | Refills: 3 | Status: ACTIVE | COMMUNITY

## 2024-03-01 RX ORDER — PANTOPRAZOLE SODIUM 40 MG/1
1 TABLET 30 MINUTES BEFORE MORNING MEAL TABLET, DELAYED RELEASE ORAL ONCE A DAY
Qty: 30 | Refills: 3 | Status: ACTIVE | COMMUNITY
Start: 2023-12-01

## 2024-03-01 RX ORDER — RIFAXIMIN 550 MG/1
1 TABLET TABLET ORAL THREE TIMES A DAY
Qty: 42 TABLET | Refills: 3 | OUTPATIENT
Start: 2024-03-01 | End: 2024-04-26

## 2024-03-01 RX ORDER — NITROGLYCERIN 0.4 MG/1
NITROGLYCERIN( 0.4MG SUBLINGUAL  AS NEEDED ) ACTIVE -HX ENTRY TABLET SUBLINGUAL AS NEEDED
Status: ACTIVE | COMMUNITY
Start: 2020-12-15

## 2024-03-01 RX ORDER — ALPRAZOLAM 0.5 MG/1
TAKE ONE TABLET BY MOUTH EVERY 8 HOURS AS NEEDED TABLET ORAL
Qty: 90 UNSPECIFIED | Refills: 2 | Status: ACTIVE | COMMUNITY

## 2024-03-01 RX ORDER — FAMOTIDINE 20 MG/1
1 TABLET TABLET, FILM COATED ORAL AT BEDTIME
Qty: 30 | Refills: 3 | OUTPATIENT
Start: 2024-03-01

## 2024-03-01 RX ORDER — PANTOPRAZOLE SODIUM 40 MG/1
1 TABLET 30 MINUTES BEFORE MORNING MEAL TABLET, DELAYED RELEASE ORAL TWICE A DAY
Qty: 60 | Refills: 1 | Status: ACTIVE | COMMUNITY
Start: 2024-01-10

## 2024-03-01 RX ORDER — ASPIRIN 81 MG/1
LOW-DOSE ASPIRIN( 81MG ORAL  DAILY ) ACTIVE -HX ENTRY TABLET, COATED ORAL DAILY
Status: ACTIVE | COMMUNITY
Start: 2020-12-15

## 2024-03-01 RX ORDER — SUCRALFATE 1 G/1
1 TABLET ON AN EMPTY STOMACH TABLET ORAL TWICE A DAY
Qty: 60 | Refills: 1 | Status: ACTIVE | COMMUNITY
Start: 2024-01-10 | End: 2024-03-10

## 2024-03-01 RX ORDER — ONDANSETRON 4 MG/1
1 TABLET ON THE TONGUE AND ALLOW TO DISSOLVE TABLET, ORALLY DISINTEGRATING ORAL
Qty: 30 TABLET | Refills: 0 | Status: ACTIVE | COMMUNITY
Start: 2023-11-10

## 2024-03-01 NOTE — HPI-TODAY'S VISIT:
Patient presents for follow up of epigastric pain with blacks tools s/p EGD (2/14/24) with Dr. Irene. Refers continued mild epigastric discomfort, sometimes exacerbated with meals. Denies recurrence of black stools. Refer currently takes PPI once daily and Carafate BID. Refers new early satiety and accompanying occasional nausea. Refers persistent abdominal bloating and admits to some loose stools. Did complete recent breath test (2/22/24). Refers hx of multiple abdominopelvic surgeries, most recently pelvic floor surgery (9/2023) with post-op complication of SBO and incarcerated umbilical hernia. Refer she did have CT Abd/Pelvis (11/14/23) showing panniculitis and subsequently completed 2 weeks therapy with celecoxib 100mg BID with significant improvement of symptoms. Refers hx of SCAD (dx 2018) and FMD (dx 2019) and states she is followed by [facility]. Denies fever, chills, vomiting, dysphagia, odynophagia, hematochezia, melena or unintentional weight loss.

## 2024-03-01 NOTE — PHYSICAL EXAM GASTROINTESTINAL
Abdomen (Limited by body habitus)  , soft, mildly tender to deep palpation of epigastric region o/w unremarkable, nondistended , no guarding or rigidity , no masses palpable , normal bowel sounds , Liver and Spleen , no hepatomegaly present , no hepatosplenomegaly , liver nontender , spleen not palpable

## 2024-04-05 ENCOUNTER — LAB (OUTPATIENT)
Dept: URBAN - METROPOLITAN AREA CLINIC 68 | Facility: CLINIC | Age: 59
End: 2024-04-05

## 2024-04-05 ENCOUNTER — OV EP (OUTPATIENT)
Dept: URBAN - METROPOLITAN AREA CLINIC 68 | Facility: CLINIC | Age: 59
End: 2024-04-05
Payer: COMMERCIAL

## 2024-04-05 VITALS
DIASTOLIC BLOOD PRESSURE: 80 MMHG | HEART RATE: 67 BPM | OXYGEN SATURATION: 99 % | WEIGHT: 176 LBS | TEMPERATURE: 97.8 F | BODY MASS INDEX: 29.32 KG/M2 | SYSTOLIC BLOOD PRESSURE: 124 MMHG | HEIGHT: 65 IN

## 2024-04-05 DIAGNOSIS — K92.1 MELENA: ICD-10-CM

## 2024-04-05 DIAGNOSIS — R10.13 EPIGASTRIC PAIN: ICD-10-CM

## 2024-04-05 DIAGNOSIS — R19.5 LOOSE STOOLS: ICD-10-CM

## 2024-04-05 DIAGNOSIS — K63.89 SMALL INTESTINAL BACTERIAL OVERGROWTH (SIBO): ICD-10-CM

## 2024-04-05 PROCEDURE — 99214 OFFICE O/P EST MOD 30 MIN: CPT

## 2024-04-05 RX ORDER — ASPIRIN 81 MG/1
LOW-DOSE ASPIRIN( 81MG ORAL  DAILY ) ACTIVE -HX ENTRY TABLET, COATED ORAL DAILY
Status: ACTIVE | COMMUNITY
Start: 2020-12-15

## 2024-04-05 RX ORDER — RIFAXIMIN 550 MG/1
1 TABLET TABLET ORAL THREE TIMES A DAY
Qty: 42 TABLET | Refills: 3 | Status: ACTIVE | COMMUNITY
Start: 2024-03-01 | End: 2024-04-26

## 2024-04-05 RX ORDER — ALPRAZOLAM 0.5 MG/1
TAKE ONE TABLET BY MOUTH EVERY 8 HOURS AS NEEDED TABLET ORAL
Qty: 90 UNSPECIFIED | Refills: 2 | Status: ACTIVE | COMMUNITY

## 2024-04-05 RX ORDER — SOD SULF/POT CHLORIDE/MAG SULF 1.479 G
12 TABLETS THE MORNING BEFORE THE PROCEDURE (AT 10AM) AND 12 TABLETS THE EVENING BEFORE THE PROCEDURE (AT 6PM) TABLET ORAL TWICE A DAY
Qty: 24 | Refills: 0 | OUTPATIENT
Start: 2024-04-05 | End: 2024-04-06

## 2024-04-05 RX ORDER — ROSUVASTATIN CALCIUM 5 MG/1
ROSUVASTATIN CALCIUM( 5MG ORAL 1 DAILY ) ACTIVE -HX ENTRY TABLET, FILM COATED ORAL DAILY
Status: ACTIVE | COMMUNITY
Start: 2020-12-15

## 2024-04-05 RX ORDER — FAMOTIDINE 20 MG/1
1 TABLET TABLET, FILM COATED ORAL AT BEDTIME
Qty: 30 | Refills: 3 | Status: ACTIVE | COMMUNITY
Start: 2024-03-01

## 2024-04-05 RX ORDER — PANTOPRAZOLE SODIUM 40 MG/1
1 TABLET 30 MINUTES BEFORE MORNING MEAL TABLET, DELAYED RELEASE ORAL ONCE A DAY
Qty: 30 | Refills: 3 | Status: ACTIVE | COMMUNITY
Start: 2023-12-01

## 2024-04-05 RX ORDER — ONDANSETRON 4 MG/1
1 TABLET ON THE TONGUE AND ALLOW TO DISSOLVE TABLET, ORALLY DISINTEGRATING ORAL
Qty: 30 TABLET | Refills: 0 | Status: ACTIVE | COMMUNITY
Start: 2023-11-10

## 2024-04-05 RX ORDER — MONTELUKAST 10 MG/1
MONTELUKAST SODIUM( 10MG ORAL  DAILY ) ACTIVE -HX ENTRY TABLET, FILM COATED ORAL DAILY
Status: ACTIVE | COMMUNITY
Start: 2020-12-15

## 2024-04-05 RX ORDER — TRAMADOL HYDROCHLORIDE 50 MG/1
TAKE ONE TABLET BY MOUTH EVERY 6 HOURS AS NEEDED FOR SEVERE PAIN (7-10) FOR UP TO THREE DAYS TABLET, FILM COATED ORAL
Qty: 12 UNSPECIFIED | Refills: 0 | Status: ACTIVE | COMMUNITY

## 2024-04-05 RX ORDER — BUDESONIDE, GLYCOPYRROLATE, AND FORMOTEROL FUMARATE 160; 9; 4.8 UG/1; UG/1; UG/1
INHALE TWO PUFFS BY MOUTH TWICE A DAY AS DIRECTED AEROSOL, METERED RESPIRATORY (INHALATION)
Qty: 10.7 UNSPECIFIED | Refills: 0 | Status: ACTIVE | COMMUNITY

## 2024-04-05 RX ORDER — METOPROLOL SUCCINATE 50 MG/1
TAKE ONE TABLET BY MOUTH ONE TIME DAILY DO NOT CRUSH OR CHEW TABLET, EXTENDED RELEASE ORAL
Qty: 90 UNSPECIFIED | Refills: 3 | Status: ACTIVE | COMMUNITY

## 2024-04-05 RX ORDER — NITROGLYCERIN 0.4 MG/1
NITROGLYCERIN( 0.4MG SUBLINGUAL  AS NEEDED ) ACTIVE -HX ENTRY TABLET SUBLINGUAL AS NEEDED
Status: ACTIVE | COMMUNITY
Start: 2020-12-15

## 2024-04-05 NOTE — HPI-TODAY'S VISIT:
Patient presents for follow up of  SIBO s/p abx therapy with xifaxin. Refers some improvement of previous symptoms, but does continue with recurring black stools.  Refers some continued epigastric discomfort, early satiety, and nausea. Did complete recent HIDA (4/2024) and is o/w pending GES. Refers did have recent EGD (2/2024) w/o evidence of acute GI bleed or ulceration(s).  Refers has been on PPI BID and carafate in past, but did switch to H2 blocker therapy after unremarkable EGD. Refers now with recurring melena, described as some formed black BMs and some loose black BMs. Refers does take 1/2 tab oral iron for years but has never experienced black stools like this. Denies peptobismol use. Refers hx of multiple abdominopelvic surgeries and pelvic floor surgery (9/2023) with post-op complication of SBO and incarcerated umbilical hernia. Refers previous CT A/P (11/2023) showing panniculitis and subsequently completed 2 weeks therapy with celecoxib 100mg BID with significant improvement of symptoms. Refers hx of SCAD (dx 2018) and FMD (dx 2019); followed by [facility]. Denies fever, chills, vomiting, dysphagia, odynophagia, hematochezia, melena or unintentional weight loss.

## 2024-04-11 LAB
% SATURATION: 14
FERRITIN: 9
HEMATOCRIT: 46.2
HEMOGLOBIN: 15.3
IRON BINDING CAPACITY: 457
IRON, TOTAL: 62
MCH: 28.2
MCHC: 33.1
MCV: 85.1
MPV: 9.4
PLATELET COUNT: 364
RDW: 13.3
RED BLOOD CELL COUNT: 5.43
WHITE BLOOD CELL COUNT: 11

## 2024-05-13 ENCOUNTER — OFFICE VISIT (OUTPATIENT)
Dept: URBAN - METROPOLITAN AREA SURGERY CENTER 12 | Facility: SURGERY CENTER | Age: 59
End: 2024-05-13

## 2024-05-30 ENCOUNTER — OFFICE VISIT (OUTPATIENT)
Dept: URBAN - METROPOLITAN AREA SURGERY CENTER 12 | Facility: SURGERY CENTER | Age: 59
End: 2024-05-30
Payer: COMMERCIAL

## 2024-05-30 ENCOUNTER — DASHBOARD ENCOUNTERS (OUTPATIENT)
Age: 59
End: 2024-05-30

## 2024-05-30 DIAGNOSIS — K57.30 DIVERTICULOSIS OF LARGE INTESTINE WITHOUT PERFORATION OR ABSCESS WITHOUT BLEEDING: ICD-10-CM

## 2024-05-30 DIAGNOSIS — K64.8 OTHER HEMORRHOIDS: ICD-10-CM

## 2024-05-30 DIAGNOSIS — R19.4 CHANGE IN BOWEL HABIT: ICD-10-CM

## 2024-05-30 PROCEDURE — 45380 COLONOSCOPY AND BIOPSY: CPT | Performed by: INTERNAL MEDICINE

## 2024-05-30 RX ORDER — PANTOPRAZOLE SODIUM 40 MG/1
1 TABLET 30 MINUTES BEFORE MORNING MEAL TABLET, DELAYED RELEASE ORAL ONCE A DAY
Qty: 30 | Refills: 3 | Status: ACTIVE | COMMUNITY
Start: 2023-12-01

## 2024-05-30 RX ORDER — ASPIRIN 81 MG/1
LOW-DOSE ASPIRIN( 81MG ORAL  DAILY ) ACTIVE -HX ENTRY TABLET, COATED ORAL DAILY
Status: ACTIVE | COMMUNITY
Start: 2020-12-15

## 2024-05-30 RX ORDER — ROSUVASTATIN CALCIUM 5 MG/1
ROSUVASTATIN CALCIUM( 5MG ORAL 1 DAILY ) ACTIVE -HX ENTRY TABLET, FILM COATED ORAL DAILY
Status: ACTIVE | COMMUNITY
Start: 2020-12-15

## 2024-05-30 RX ORDER — METOPROLOL SUCCINATE 50 MG/1
TAKE ONE TABLET BY MOUTH ONE TIME DAILY DO NOT CRUSH OR CHEW TABLET, EXTENDED RELEASE ORAL
Qty: 90 UNSPECIFIED | Refills: 3 | Status: ACTIVE | COMMUNITY

## 2024-05-30 RX ORDER — NITROGLYCERIN 0.4 MG/1
NITROGLYCERIN( 0.4MG SUBLINGUAL  AS NEEDED ) ACTIVE -HX ENTRY TABLET SUBLINGUAL AS NEEDED
Status: ACTIVE | COMMUNITY
Start: 2020-12-15

## 2024-05-30 RX ORDER — ONDANSETRON 4 MG/1
1 TABLET ON THE TONGUE AND ALLOW TO DISSOLVE TABLET, ORALLY DISINTEGRATING ORAL
Qty: 30 TABLET | Refills: 0 | Status: ACTIVE | COMMUNITY
Start: 2023-11-10

## 2024-05-30 RX ORDER — FAMOTIDINE 20 MG/1
1 TABLET TABLET, FILM COATED ORAL AT BEDTIME
Qty: 30 | Refills: 3 | Status: ACTIVE | COMMUNITY
Start: 2024-03-01

## 2024-05-30 RX ORDER — MONTELUKAST 10 MG/1
MONTELUKAST SODIUM( 10MG ORAL  DAILY ) ACTIVE -HX ENTRY TABLET, FILM COATED ORAL DAILY
Status: ACTIVE | COMMUNITY
Start: 2020-12-15

## 2024-05-30 RX ORDER — ALPRAZOLAM 0.5 MG/1
TAKE ONE TABLET BY MOUTH EVERY 8 HOURS AS NEEDED TABLET ORAL
Qty: 90 UNSPECIFIED | Refills: 2 | Status: ACTIVE | COMMUNITY

## 2024-05-30 RX ORDER — BUDESONIDE, GLYCOPYRROLATE, AND FORMOTEROL FUMARATE 160; 9; 4.8 UG/1; UG/1; UG/1
INHALE TWO PUFFS BY MOUTH TWICE A DAY AS DIRECTED AEROSOL, METERED RESPIRATORY (INHALATION)
Qty: 10.7 UNSPECIFIED | Refills: 0 | Status: ACTIVE | COMMUNITY

## 2024-05-30 RX ORDER — TRAMADOL HYDROCHLORIDE 50 MG/1
TAKE ONE TABLET BY MOUTH EVERY 6 HOURS AS NEEDED FOR SEVERE PAIN (7-10) FOR UP TO THREE DAYS TABLET, FILM COATED ORAL
Qty: 12 UNSPECIFIED | Refills: 0 | Status: ACTIVE | COMMUNITY

## 2024-05-31 ENCOUNTER — OFFICE VISIT (OUTPATIENT)
Dept: URBAN - METROPOLITAN AREA CLINIC 68 | Facility: CLINIC | Age: 59
End: 2024-05-31

## 2024-06-12 ENCOUNTER — OFFICE VISIT (OUTPATIENT)
Dept: URBAN - METROPOLITAN AREA CLINIC 68 | Facility: CLINIC | Age: 59
End: 2024-06-12
Payer: COMMERCIAL

## 2024-06-12 DIAGNOSIS — K58.0 IRRITABLE BOWEL SYNDROME WITH DIARRHEA: ICD-10-CM

## 2024-06-12 DIAGNOSIS — K59.04 CHRONIC IDIOPATHIC CONSTIPATION: ICD-10-CM

## 2024-06-12 DIAGNOSIS — K29.30 CHRONIC SUPERFICIAL GASTRITIS WITHOUT BLEEDING: ICD-10-CM

## 2024-06-12 DIAGNOSIS — R12 HEARTBURN: ICD-10-CM

## 2024-06-12 PROCEDURE — 99213 OFFICE O/P EST LOW 20 MIN: CPT | Performed by: INTERNAL MEDICINE

## 2024-06-12 RX ORDER — BUDESONIDE, GLYCOPYRROLATE, AND FORMOTEROL FUMARATE 160; 9; 4.8 UG/1; UG/1; UG/1
INHALE TWO PUFFS BY MOUTH TWICE A DAY AS DIRECTED AEROSOL, METERED RESPIRATORY (INHALATION)
Qty: 10.7 UNSPECIFIED | Refills: 0 | Status: ACTIVE | COMMUNITY

## 2024-06-12 RX ORDER — FAMOTIDINE 20 MG/1
1 TABLET TABLET, FILM COATED ORAL AT BEDTIME
Qty: 30 | Refills: 3 | Status: ACTIVE | COMMUNITY
Start: 2024-03-01

## 2024-06-12 RX ORDER — TRAMADOL HYDROCHLORIDE 50 MG/1
TAKE ONE TABLET BY MOUTH EVERY 6 HOURS AS NEEDED FOR SEVERE PAIN (7-10) FOR UP TO THREE DAYS TABLET, FILM COATED ORAL
Qty: 12 UNSPECIFIED | Refills: 0 | Status: ACTIVE | COMMUNITY

## 2024-06-12 RX ORDER — DILTIAZEM HYDROCHLORIDE 360 MG/1
TAKE ONE CAPSULE BY MOUTH ONE TIME DAILY CAPSULE, EXTENDED RELEASE ORAL
Qty: 90 UNSPECIFIED | Refills: 0 | Status: ACTIVE | COMMUNITY

## 2024-06-12 RX ORDER — NITROGLYCERIN 0.4 MG/1
NITROGLYCERIN( 0.4MG SUBLINGUAL  AS NEEDED ) ACTIVE -HX ENTRY TABLET SUBLINGUAL AS NEEDED
Status: ACTIVE | COMMUNITY
Start: 2020-12-15

## 2024-06-12 RX ORDER — ASPIRIN 81 MG/1
LOW-DOSE ASPIRIN( 81MG ORAL  DAILY ) ACTIVE -HX ENTRY TABLET, COATED ORAL DAILY
Status: ACTIVE | COMMUNITY
Start: 2020-12-15

## 2024-06-12 RX ORDER — ONDANSETRON 4 MG/1
1 TABLET ON THE TONGUE AND ALLOW TO DISSOLVE TABLET, ORALLY DISINTEGRATING ORAL
Qty: 30 TABLET | Refills: 0 | Status: ACTIVE | COMMUNITY
Start: 2023-11-10

## 2024-06-12 RX ORDER — PANTOPRAZOLE SODIUM 40 MG/1
1 TABLET 30 MINUTES BEFORE MORNING MEAL TABLET, DELAYED RELEASE ORAL ONCE A DAY
Qty: 30 | Refills: 3 | Status: ACTIVE | COMMUNITY
Start: 2023-12-01

## 2024-06-12 RX ORDER — MONTELUKAST 10 MG/1
MONTELUKAST SODIUM( 10MG ORAL  DAILY ) ACTIVE -HX ENTRY TABLET, FILM COATED ORAL DAILY
Status: ACTIVE | COMMUNITY
Start: 2020-12-15

## 2024-06-12 RX ORDER — ALPRAZOLAM 0.5 MG/1
TAKE ONE TABLET BY MOUTH EVERY 8 HOURS AS NEEDED TABLET ORAL
Qty: 90 UNSPECIFIED | Refills: 2 | Status: ACTIVE | COMMUNITY

## 2024-06-12 RX ORDER — ROSUVASTATIN CALCIUM 5 MG/1
ROSUVASTATIN CALCIUM( 5MG ORAL 1 DAILY ) ACTIVE -HX ENTRY TABLET, FILM COATED ORAL DAILY
Status: ACTIVE | COMMUNITY
Start: 2020-12-15

## 2024-06-12 NOTE — PHYSICAL EXAM CARDIOVASCULAR:
no edema,  no murmurs,  regular rate and rhythm Assistance with ambulation/Assistance OOB with selected safe patient handling equipment/Communicate Risk of Fall with Harm to all staff/Monitor gait and stability/Reinforce activity limits and safety measures with patient and family/Sit up slowly, dangle for a short time, stand at bedside before walking/Tailored Fall Risk Interventions/Use of alarms - bed, chair and/or voice tab/Visual Cue: Yellow wristband and red socks/Bed in lowest position, wheels locked, appropriate side rails in place/Call bell, personal items and telephone in reach/Instruct patient to call for assistance before getting out of bed or chair/Non-slip footwear when patient is out of bed/[place] to call system/Physically safe environment - no spills, clutter or unnecessary equipment/Purposeful Proactive Rounding/Room/bathroom lighting operational, light cord in reach

## 2024-06-12 NOTE — PHYSICAL EXAM GASTROINTESTINAL
Abdomen , soft, nontender, nondistended , no guarding or rigidity , no masses palpable , normal bowel sounds , Liver and Spleen , no hepatomegaly present , no hepatosplenomegaly , liver nontender , spleen not palpable
DISCHARGE

## 2024-06-12 NOTE — HPI-TODAY'S VISIT:
Patient evaluated after having recent colonsocopy that was negative including random colon biopsies.  Today referred that is feeling ok. Referred overall is feeling good. Referred is on miralax for chronic constipation.  Denies nausea, vomits, dysphagia, odynophagia, heartburn, abdominal pain,  GI bleeding or weight loss

## 2024-08-19 ENCOUNTER — OFFICE VISIT (OUTPATIENT)
Dept: URBAN - METROPOLITAN AREA CLINIC 68 | Facility: CLINIC | Age: 59
End: 2024-08-19
Payer: COMMERCIAL

## 2024-08-19 VITALS
SYSTOLIC BLOOD PRESSURE: 128 MMHG | WEIGHT: 176 LBS | HEIGHT: 65 IN | BODY MASS INDEX: 29.32 KG/M2 | DIASTOLIC BLOOD PRESSURE: 72 MMHG

## 2024-08-19 DIAGNOSIS — R12 HEARTBURN: ICD-10-CM

## 2024-08-19 DIAGNOSIS — K57.30 DIVERTICULOSIS OF COLON: ICD-10-CM

## 2024-08-19 DIAGNOSIS — Z86.010 HISTORY OF COLON POLYPS: ICD-10-CM

## 2024-08-19 DIAGNOSIS — K59.04 CHRONIC IDIOPATHIC CONSTIPATION: ICD-10-CM

## 2024-08-19 DIAGNOSIS — K29.30 CHRONIC SUPERFICIAL GASTRITIS WITHOUT BLEEDING: ICD-10-CM

## 2024-08-19 PROCEDURE — 99213 OFFICE O/P EST LOW 20 MIN: CPT | Performed by: INTERNAL MEDICINE

## 2024-08-19 RX ORDER — FAMOTIDINE 20 MG/1
1 TABLET TABLET, FILM COATED ORAL AT BEDTIME
Qty: 30 | Refills: 3 | Status: ACTIVE | COMMUNITY
Start: 2024-03-01

## 2024-08-19 RX ORDER — ONDANSETRON 4 MG/1
1 TABLET ON THE TONGUE AND ALLOW TO DISSOLVE TABLET, ORALLY DISINTEGRATING ORAL
Qty: 30 TABLET | Refills: 0 | Status: ACTIVE | COMMUNITY
Start: 2023-11-10

## 2024-08-19 RX ORDER — TRAMADOL HYDROCHLORIDE 50 MG/1
TAKE ONE TABLET BY MOUTH EVERY 6 HOURS AS NEEDED FOR SEVERE PAIN (7-10) FOR UP TO THREE DAYS TABLET, FILM COATED ORAL
Qty: 12 UNSPECIFIED | Refills: 0 | Status: ACTIVE | COMMUNITY

## 2024-08-19 RX ORDER — PANTOPRAZOLE SODIUM 40 MG/1
1 TABLET 30 MINUTES BEFORE MORNING MEAL TABLET, DELAYED RELEASE ORAL ONCE A DAY
Qty: 30 | Refills: 3 | Status: ACTIVE | COMMUNITY
Start: 2023-12-01

## 2024-08-19 RX ORDER — BUDESONIDE, GLYCOPYRROLATE, AND FORMOTEROL FUMARATE 160; 9; 4.8 UG/1; UG/1; UG/1
INHALE TWO PUFFS BY MOUTH TWICE A DAY AS DIRECTED AEROSOL, METERED RESPIRATORY (INHALATION)
Qty: 10.7 UNSPECIFIED | Refills: 0 | Status: ACTIVE | COMMUNITY

## 2024-08-19 RX ORDER — ASPIRIN 81 MG/1
LOW-DOSE ASPIRIN( 81MG ORAL  DAILY ) ACTIVE -HX ENTRY TABLET, COATED ORAL DAILY
Status: ACTIVE | COMMUNITY
Start: 2020-12-15

## 2024-08-19 RX ORDER — MONTELUKAST 10 MG/1
MONTELUKAST SODIUM( 10MG ORAL  DAILY ) ACTIVE -HX ENTRY TABLET, FILM COATED ORAL DAILY
Status: ACTIVE | COMMUNITY
Start: 2020-12-15

## 2024-08-19 RX ORDER — ALPRAZOLAM 0.5 MG/1
TAKE ONE TABLET BY MOUTH EVERY 8 HOURS AS NEEDED TABLET ORAL
Qty: 90 UNSPECIFIED | Refills: 2 | Status: ACTIVE | COMMUNITY

## 2024-08-19 RX ORDER — ROSUVASTATIN CALCIUM 5 MG/1
ROSUVASTATIN CALCIUM( 5MG ORAL 1 DAILY ) ACTIVE -HX ENTRY TABLET, FILM COATED ORAL DAILY
Status: ACTIVE | COMMUNITY
Start: 2020-12-15

## 2024-08-19 RX ORDER — NITROGLYCERIN 0.4 MG/1
NITROGLYCERIN( 0.4MG SUBLINGUAL  AS NEEDED ) ACTIVE -HX ENTRY TABLET SUBLINGUAL AS NEEDED
Status: ACTIVE | COMMUNITY
Start: 2020-12-15

## 2024-08-19 RX ORDER — DILTIAZEM HYDROCHLORIDE 360 MG/1
TAKE ONE CAPSULE BY MOUTH ONE TIME DAILY CAPSULE, EXTENDED RELEASE ORAL
Qty: 90 UNSPECIFIED | Refills: 0 | Status: ACTIVE | COMMUNITY

## 2024-08-19 NOTE — HPI-TODAY'S VISIT:
Patient evaluated after having receng GES reported with normal gastric emptying.  Today referred that overall is feling good. Referred having intermittent episodes of abdominal cramps.  Denies nausea, vomits, dysphagia, odynophagia, heartburn, abdominal pain, diarrhea, constipation GI bleeding or weight loss

## 2024-11-12 ENCOUNTER — TELEPHONE ENCOUNTER (OUTPATIENT)
Dept: URBAN - METROPOLITAN AREA CLINIC 68 | Facility: CLINIC | Age: 59
End: 2024-11-12

## 2024-11-12 RX ORDER — PANTOPRAZOLE SODIUM 40 MG/1
1 TABLET 30 MINUTES BEFORE MORNING MEAL TABLET, DELAYED RELEASE ORAL ONCE A DAY
Qty: 30 | Refills: 3
Start: 2023-12-01

## 2025-02-18 ENCOUNTER — LAB OUTSIDE AN ENCOUNTER (OUTPATIENT)
Dept: URBAN - METROPOLITAN AREA CLINIC 66 | Facility: CLINIC | Age: 60
End: 2025-02-18

## 2025-02-18 ENCOUNTER — OFFICE VISIT (OUTPATIENT)
Dept: URBAN - METROPOLITAN AREA CLINIC 66 | Facility: CLINIC | Age: 60
End: 2025-02-18
Payer: COMMERCIAL

## 2025-02-18 VITALS
BODY MASS INDEX: 28.99 KG/M2 | WEIGHT: 174 LBS | DIASTOLIC BLOOD PRESSURE: 76 MMHG | HEIGHT: 65 IN | SYSTOLIC BLOOD PRESSURE: 124 MMHG

## 2025-02-18 DIAGNOSIS — K29.30 CHRONIC SUPERFICIAL GASTRITIS WITHOUT BLEEDING: ICD-10-CM

## 2025-02-18 DIAGNOSIS — D50.9 IRON DEFICIENCY ANEMIA, UNSPECIFIED: ICD-10-CM

## 2025-02-18 DIAGNOSIS — K92.2 GASTROINTESTINAL HEMORRHAGE, UNSPECIFIED GASTROINTESTINAL HEMORRHAGE TYPE: ICD-10-CM

## 2025-02-18 DIAGNOSIS — Z86.0100 PERSONAL HISTORY OF COLON POLYPS, UNSPECIFIED: ICD-10-CM

## 2025-02-18 DIAGNOSIS — R12 HEARTBURN: ICD-10-CM

## 2025-02-18 PROBLEM — 74474003: Status: ACTIVE | Noted: 2025-02-18

## 2025-02-18 PROCEDURE — 99214 OFFICE O/P EST MOD 30 MIN: CPT | Performed by: INTERNAL MEDICINE

## 2025-02-18 RX ORDER — ROSUVASTATIN CALCIUM 5 MG/1
ROSUVASTATIN CALCIUM( 5MG ORAL 1 DAILY ) ACTIVE -HX ENTRY TABLET, FILM COATED ORAL DAILY
Status: ACTIVE | COMMUNITY
Start: 2020-12-15

## 2025-02-18 RX ORDER — PANTOPRAZOLE SODIUM 40 MG/1
1 TABLET 30 MINUTES BEFORE MORNING MEAL TABLET, DELAYED RELEASE ORAL ONCE A DAY
Qty: 30 | Refills: 3 | Status: ACTIVE | COMMUNITY
Start: 2023-12-01

## 2025-02-18 RX ORDER — FAMOTIDINE 20 MG/1
1 TABLET TABLET, FILM COATED ORAL AT BEDTIME
Qty: 30 | Refills: 3 | Status: ON HOLD | COMMUNITY
Start: 2024-03-01

## 2025-02-18 RX ORDER — HYDROCHLOROTHIAZIDE 12.5 MG/1
TAKE ONE TABLET BY MOUTH EVERY MORNING TABLET ORAL
Qty: 90 UNSPECIFIED | Refills: 0 | Status: ACTIVE | COMMUNITY

## 2025-02-18 RX ORDER — NITROGLYCERIN 0.4 MG/1
NITROGLYCERIN( 0.4MG SUBLINGUAL  AS NEEDED ) ACTIVE -HX ENTRY TABLET SUBLINGUAL AS NEEDED
Status: ACTIVE | COMMUNITY
Start: 2020-12-15

## 2025-02-18 RX ORDER — ASPIRIN 81 MG/1
LOW-DOSE ASPIRIN( 81MG ORAL  DAILY ) ACTIVE -HX ENTRY TABLET, COATED ORAL DAILY
Status: ACTIVE | COMMUNITY
Start: 2020-12-15

## 2025-02-18 RX ORDER — ALPRAZOLAM 0.5 MG/1
TAKE ONE TABLET BY MOUTH EVERY 8 HOURS AS NEEDED TABLET ORAL
Qty: 90 UNSPECIFIED | Refills: 2 | Status: ACTIVE | COMMUNITY

## 2025-02-18 RX ORDER — BUDESONIDE, GLYCOPYRROLATE, AND FORMOTEROL FUMARATE 160; 9; 4.8 UG/1; UG/1; UG/1
INHALE TWO PUFFS BY MOUTH TWICE A DAY AS DIRECTED AEROSOL, METERED RESPIRATORY (INHALATION)
Qty: 10.7 UNSPECIFIED | Refills: 0 | Status: ACTIVE | COMMUNITY

## 2025-02-18 RX ORDER — TRAMADOL HYDROCHLORIDE 50 MG/1
TAKE ONE TABLET BY MOUTH EVERY 6 HOURS AS NEEDED FOR SEVERE PAIN (7-10) FOR UP TO THREE DAYS TABLET, FILM COATED ORAL
Qty: 12 UNSPECIFIED | Refills: 0 | Status: ACTIVE | COMMUNITY

## 2025-02-18 RX ORDER — MONTELUKAST 10 MG/1
MONTELUKAST SODIUM( 10MG ORAL  DAILY ) ACTIVE -HX ENTRY TABLET, FILM COATED ORAL DAILY
Status: ACTIVE | COMMUNITY
Start: 2020-12-15

## 2025-02-18 RX ORDER — EPINEPHRINE 0.3 MG/.3ML
INJECT ONE PEN INTO THE THIGH OR AS DIRECTED. AFTER ADMINISTRATION CALL 911. IF ANAPHYLACTIC SYMPTOMS PERSIST AFTER FIRST DOSE, MAY REPEAT D INJECTION SUBCUTANEOUS
Qty: 2 UNSPECIFIED | Refills: 0 | Status: ACTIVE | COMMUNITY

## 2025-02-18 RX ORDER — DILTIAZEM HYDROCHLORIDE 360 MG/1
TAKE ONE CAPSULE BY MOUTH ONE TIME DAILY CAPSULE, EXTENDED RELEASE ORAL
Qty: 90 UNSPECIFIED | Refills: 3 | Status: ACTIVE | COMMUNITY

## 2025-02-18 RX ORDER — HYDRALAZINE HYDROCHLORIDE 50 MG/1
1 TABLET WITH FOOD TABLET, FILM COATED ORAL THREE TIMES A DAY
Status: ACTIVE | COMMUNITY

## 2025-02-18 RX ORDER — HYDRALAZINE HYDROCHLORIDE 25 MG/1
TAKE ONE TABLET BY MOUTH THREE TIMES A DAY ( IN THE MORNING, AT NOON AND AT BEDTIME ) TABLET ORAL
Qty: 270 UNSPECIFIED | Refills: 2 | Status: ACTIVE | COMMUNITY

## 2025-02-18 RX ORDER — ALBUTEROL SULFATE AND BUDESONIDE 90; 80 UG/1; UG/1
INHALE TWO PUFFS BY MOUTH EVERY 4 HOURS AEROSOL, METERED RESPIRATORY (INHALATION)
Qty: 10.7 UNSPECIFIED | Refills: 1 | Status: ACTIVE | COMMUNITY

## 2025-02-18 RX ORDER — CETIRIZINE HYDROCHLORIDE 10 MG/1
TAKE ONE TABLET BY MOUTH ONE TIME DAILY TABLET ORAL
Qty: 30 UNSPECIFIED | Refills: 5 | Status: ACTIVE | COMMUNITY

## 2025-02-18 NOTE — HPI-TODAY'S VISIT:
Patient evaluated due to IBS, gastritis and iron def anemia. Referred is being receiving Iron infusions, last one was in January. 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  Denies nausea, vomits, dysphagia, odynophagia, heartburn, abdominal pain, diarrhea, constipation GI bleeding or weight loss

## 2025-03-06 ENCOUNTER — OFFICE VISIT (OUTPATIENT)
Dept: URBAN - METROPOLITAN AREA CLINIC 67 | Facility: CLINIC | Age: 60
End: 2025-03-06
Payer: COMMERCIAL

## 2025-03-06 DIAGNOSIS — D50.9 IRON DEFICIENCY ANEMIA, UNSPECIFIED IRON DEFICIENCY ANEMIA TYPE: ICD-10-CM

## 2025-03-06 PROCEDURE — 91110 GI TRC IMG INTRAL ESOPH-ILE: CPT | Performed by: INTERNAL MEDICINE

## 2025-03-07 ENCOUNTER — TELEPHONE ENCOUNTER (OUTPATIENT)
Dept: URBAN - METROPOLITAN AREA CLINIC 68 | Facility: CLINIC | Age: 60
End: 2025-03-07

## 2025-03-13 ENCOUNTER — OFFICE VISIT (OUTPATIENT)
Dept: URBAN - METROPOLITAN AREA CLINIC 66 | Facility: CLINIC | Age: 60
End: 2025-03-13

## 2025-03-24 ENCOUNTER — TELEPHONE ENCOUNTER (OUTPATIENT)
Dept: URBAN - METROPOLITAN AREA CLINIC 68 | Facility: CLINIC | Age: 60
End: 2025-03-24

## 2025-03-24 ENCOUNTER — TELEPHONE ENCOUNTER (OUTPATIENT)
Dept: URBAN - METROPOLITAN AREA CLINIC 66 | Facility: CLINIC | Age: 60
End: 2025-03-24

## 2025-03-24 RX ORDER — PANTOPRAZOLE SODIUM 40 MG/1
1 TABLET 30 MINUTES BEFORE MORNING MEAL TABLET, DELAYED RELEASE ORAL ONCE A DAY
Qty: 90 TABLET | Refills: 1
Start: 2023-12-01

## 2025-03-25 ENCOUNTER — OFFICE VISIT (OUTPATIENT)
Dept: URBAN - METROPOLITAN AREA TELEHEALTH 1 | Facility: TELEHEALTH | Age: 60
End: 2025-03-25
Payer: COMMERCIAL

## 2025-03-25 DIAGNOSIS — K63.89 SMALL INTESTINAL BACTERIAL OVERGROWTH (SIBO): ICD-10-CM

## 2025-03-25 DIAGNOSIS — K29.30 CHRONIC SUPERFICIAL GASTRITIS WITHOUT BLEEDING: ICD-10-CM

## 2025-03-25 DIAGNOSIS — Z86.0100 PERSONAL HISTORY OF COLON POLYPS, UNSPECIFIED: ICD-10-CM

## 2025-03-25 DIAGNOSIS — D50.9 IRON DEFICIENCY ANEMIA, UNSPECIFIED: ICD-10-CM

## 2025-03-25 PROCEDURE — 99213 OFFICE O/P EST LOW 20 MIN: CPT

## 2025-03-25 RX ORDER — MONTELUKAST 10 MG/1
MONTELUKAST SODIUM( 10MG ORAL  DAILY ) ACTIVE -HX ENTRY TABLET, FILM COATED ORAL DAILY
Status: ACTIVE | COMMUNITY
Start: 2020-12-15

## 2025-03-25 RX ORDER — NITROGLYCERIN 0.4 MG/1
NITROGLYCERIN( 0.4MG SUBLINGUAL  AS NEEDED ) ACTIVE -HX ENTRY TABLET SUBLINGUAL AS NEEDED
Status: ACTIVE | COMMUNITY
Start: 2020-12-15

## 2025-03-25 RX ORDER — ALPRAZOLAM 0.5 MG/1
TAKE ONE TABLET BY MOUTH EVERY 8 HOURS AS NEEDED TABLET ORAL
Qty: 90 UNSPECIFIED | Refills: 2 | Status: ACTIVE | COMMUNITY

## 2025-03-25 RX ORDER — HYDRALAZINE HYDROCHLORIDE 50 MG/1
1 TABLET WITH FOOD TABLET, FILM COATED ORAL THREE TIMES A DAY
Status: ACTIVE | COMMUNITY

## 2025-03-25 RX ORDER — CETIRIZINE HYDROCHLORIDE 10 MG/1
TAKE ONE TABLET BY MOUTH ONE TIME DAILY TABLET ORAL
Qty: 30 UNSPECIFIED | Refills: 5 | Status: ACTIVE | COMMUNITY

## 2025-03-25 RX ORDER — FAMOTIDINE 20 MG/1
1 TABLET TABLET, FILM COATED ORAL AT BEDTIME
Qty: 30 | Refills: 3 | Status: ON HOLD | COMMUNITY
Start: 2024-03-01

## 2025-03-25 RX ORDER — EPINEPHRINE 0.3 MG/.3ML
INJECT ONE PEN INTO THE THIGH OR AS DIRECTED. AFTER ADMINISTRATION CALL 911. IF ANAPHYLACTIC SYMPTOMS PERSIST AFTER FIRST DOSE, MAY REPEAT D INJECTION SUBCUTANEOUS
Qty: 2 UNSPECIFIED | Refills: 0 | Status: ACTIVE | COMMUNITY

## 2025-03-25 RX ORDER — HYDRALAZINE HYDROCHLORIDE 25 MG/1
TAKE ONE TABLET BY MOUTH THREE TIMES A DAY ( IN THE MORNING, AT NOON AND AT BEDTIME ) TABLET ORAL
Qty: 270 UNSPECIFIED | Refills: 2 | Status: ACTIVE | COMMUNITY

## 2025-03-25 RX ORDER — ALBUTEROL SULFATE AND BUDESONIDE 90; 80 UG/1; UG/1
INHALE TWO PUFFS BY MOUTH EVERY 4 HOURS AEROSOL, METERED RESPIRATORY (INHALATION)
Qty: 10.7 UNSPECIFIED | Refills: 1 | Status: ACTIVE | COMMUNITY

## 2025-03-25 RX ORDER — ASPIRIN 81 MG/1
LOW-DOSE ASPIRIN( 81MG ORAL  DAILY ) ACTIVE -HX ENTRY TABLET, COATED ORAL DAILY
Status: ACTIVE | COMMUNITY
Start: 2020-12-15

## 2025-03-25 RX ORDER — PANTOPRAZOLE SODIUM 40 MG/1
1 TABLET 30 MINUTES BEFORE MORNING MEAL TABLET, DELAYED RELEASE ORAL ONCE A DAY
Qty: 90 TABLET | Refills: 1 | Status: ACTIVE | COMMUNITY
Start: 2023-12-01

## 2025-03-25 RX ORDER — HYDROCHLOROTHIAZIDE 12.5 MG/1
TAKE ONE TABLET BY MOUTH EVERY MORNING TABLET ORAL
Qty: 90 UNSPECIFIED | Refills: 0 | Status: ACTIVE | COMMUNITY

## 2025-03-25 RX ORDER — BUDESONIDE, GLYCOPYRROLATE, AND FORMOTEROL FUMARATE 160; 9; 4.8 UG/1; UG/1; UG/1
INHALE TWO PUFFS BY MOUTH TWICE A DAY AS DIRECTED AEROSOL, METERED RESPIRATORY (INHALATION)
Qty: 10.7 UNSPECIFIED | Refills: 0 | Status: ACTIVE | COMMUNITY

## 2025-03-25 RX ORDER — DILTIAZEM HYDROCHLORIDE 360 MG/1
TAKE ONE CAPSULE BY MOUTH ONE TIME DAILY CAPSULE, EXTENDED RELEASE ORAL
Qty: 90 UNSPECIFIED | Refills: 3 | Status: ACTIVE | COMMUNITY

## 2025-03-25 RX ORDER — ROSUVASTATIN CALCIUM 5 MG/1
ROSUVASTATIN CALCIUM( 5MG ORAL 1 DAILY ) ACTIVE -HX ENTRY TABLET, FILM COATED ORAL DAILY
Status: ACTIVE | COMMUNITY
Start: 2020-12-15

## 2025-03-25 RX ORDER — TRAMADOL HYDROCHLORIDE 50 MG/1
TAKE ONE TABLET BY MOUTH EVERY 6 HOURS AS NEEDED FOR SEVERE PAIN (7-10) FOR UP TO THREE DAYS TABLET, FILM COATED ORAL
Qty: 12 UNSPECIFIED | Refills: 0 | Status: ACTIVE | COMMUNITY

## 2025-03-25 NOTE — HPI-TODAY'S VISIT:
Patient with iron def anemia presents via telehealth for follow-up sp VCE. Refers follows with Dr. Catarina Mcneil at [facility]. Denies nausea/vomiting, dysphagia, odynophagia, heartburn, abdominal pain, melena, rectal bleeding, weight loss, fever.

## 2025-03-26 ENCOUNTER — OFFICE VISIT (OUTPATIENT)
Dept: URBAN - METROPOLITAN AREA TELEHEALTH 1 | Facility: TELEHEALTH | Age: 60
End: 2025-03-26

## 2025-03-27 ENCOUNTER — TELEPHONE ENCOUNTER (OUTPATIENT)
Dept: URBAN - METROPOLITAN AREA CLINIC 68 | Facility: CLINIC | Age: 60
End: 2025-03-27

## 2025-06-05 ENCOUNTER — WEB ENCOUNTER (OUTPATIENT)
Dept: URBAN - METROPOLITAN AREA CLINIC 68 | Facility: CLINIC | Age: 60
End: 2025-06-05

## 2025-06-17 ENCOUNTER — LAB OUTSIDE AN ENCOUNTER (OUTPATIENT)
Dept: URBAN - METROPOLITAN AREA CLINIC 66 | Facility: CLINIC | Age: 60
End: 2025-06-17

## 2025-06-17 ENCOUNTER — OFFICE VISIT (OUTPATIENT)
Dept: URBAN - METROPOLITAN AREA CLINIC 66 | Facility: CLINIC | Age: 60
End: 2025-06-17
Payer: COMMERCIAL

## 2025-06-17 DIAGNOSIS — R10.9 ABDOMINAL CRAMPING: ICD-10-CM

## 2025-06-17 DIAGNOSIS — R10.13 DYSPEPSIA: ICD-10-CM

## 2025-06-17 DIAGNOSIS — K58.0 IRRITABLE BOWEL SYNDROME WITH DIARRHEA: ICD-10-CM

## 2025-06-17 DIAGNOSIS — K29.30 CHRONIC SUPERFICIAL GASTRITIS WITHOUT BLEEDING: ICD-10-CM

## 2025-06-17 DIAGNOSIS — K63.89 SMALL INTESTINAL BACTERIAL OVERGROWTH (SIBO): ICD-10-CM

## 2025-06-17 DIAGNOSIS — D50.9 ANEMIA: ICD-10-CM

## 2025-06-17 DIAGNOSIS — Z86.0100 PERSONAL HISTORY OF COLON POLYPS, UNSPECIFIED: ICD-10-CM

## 2025-06-17 PROBLEM — 162031009: Status: ACTIVE | Noted: 2025-06-17

## 2025-06-17 PROBLEM — 102614006: Status: ACTIVE | Noted: 2025-06-17

## 2025-06-17 PROCEDURE — 99214 OFFICE O/P EST MOD 30 MIN: CPT

## 2025-06-17 RX ORDER — RIFAXIMIN 550 MG/1
1 TABLET TABLET ORAL THREE TIMES A DAY
Qty: 42 TABLET | Refills: 1 | OUTPATIENT
Start: 2025-06-17 | End: 2025-07-15

## 2025-06-17 RX ORDER — CLONIDINE HYDROCHLORIDE 0.1 MG/1
1 TABLET TABLET ORAL ONCE A DAY
Status: ACTIVE | COMMUNITY

## 2025-06-17 RX ORDER — TRAZODONE HYDROCHLORIDE 50 MG/1
1 TABLET AT BEDTIME AS NEEDED TABLET ORAL ONCE A DAY
Status: ACTIVE | COMMUNITY

## 2025-06-17 RX ORDER — ONDANSETRON 4 MG/1
1 TABLET ON THE TONGUE AND ALLOW TO DISSOLVE TABLET, ORALLY DISINTEGRATING ORAL ONCE A DAY
Status: ACTIVE | COMMUNITY

## 2025-06-17 RX ORDER — NITROGLYCERIN 0.4 MG/1
1 TABLET UNDER THE TONGUE AND ALLOW TO DISSOLVE AS NEEDED. TAKE EVERY 5 MINUTES UP TO 3 TIMES IF CHEST PAIN PERSISTS TABLET SUBLINGUAL THREE TIMES A DAY
Status: ACTIVE | COMMUNITY

## 2025-06-17 RX ORDER — ALBUTEROL SULFATE AND BUDESONIDE 90; 80 UG/1; UG/1
2 PUFFS AS NEEDED AEROSOL, METERED RESPIRATORY (INHALATION)
Status: ACTIVE | COMMUNITY

## 2025-06-17 RX ORDER — MONTELUKAST 10 MG/1
1 TABLET TABLET, FILM COATED ORAL ONCE A DAY
Status: ACTIVE | COMMUNITY

## 2025-06-17 RX ORDER — HUMAN IMMUNOGLOBULIN G 0.2 G/ML
AS DIRECTED LIQUID SUBCUTANEOUS
Status: ACTIVE | COMMUNITY

## 2025-06-17 RX ORDER — BUDESONIDE, GLYCOPYRROLATE, AND FORMOTEROL FUMARATE 160; 9; 4.8 UG/1; UG/1; UG/1
2 PUFFS AEROSOL, METERED RESPIRATORY (INHALATION) TWICE A DAY
Status: ACTIVE | COMMUNITY

## 2025-06-17 RX ORDER — VALSARTAN 80 MG/1
1 TABLET TABLET, FILM COATED ORAL ONCE A DAY
Status: ACTIVE | COMMUNITY

## 2025-06-17 RX ORDER — PREDNISONE 5 MG/1
1 TABLET WITH FOOD OR MILK TABLET ORAL ONCE A DAY
Status: ACTIVE | COMMUNITY

## 2025-06-17 RX ORDER — BENZONATATE 200 MG/1
1 CAPSULE AS NEEDED CAPSULE ORAL THREE TIMES A DAY
Status: ACTIVE | COMMUNITY

## 2025-06-17 RX ORDER — ALPRAZOLAM 0.5 MG/1
1 TABLET TABLET ORAL TWICE A DAY
Status: ACTIVE | COMMUNITY

## 2025-06-17 RX ORDER — AZELASTINE HYDROCHLORIDE 137 UG/1
2 PUFFS (1 SPRAY IN EACH NOSTRIL) SPRAY, METERED NASAL TWICE A DAY
Status: ACTIVE | COMMUNITY

## 2025-06-17 RX ORDER — CETIRIZINE HYDROCHLORIDE 10 MG/1
1 TABLET TABLET, FILM COATED ORAL ONCE A DAY
Status: ACTIVE | COMMUNITY

## 2025-06-17 RX ORDER — ROSUVASTATIN CALCIUM 10 MG/1
1 TABLET TABLET, COATED ORAL ONCE A DAY
Status: ACTIVE | COMMUNITY

## 2025-06-17 RX ORDER — HYDROCHLOROTHIAZIDE 12.5 MG/1
1 CAPSULE IN THE MORNING CAPSULE, GELATIN COATED ORAL ONCE A DAY
Status: ACTIVE | COMMUNITY

## 2025-06-17 RX ORDER — ACETAMINOPHEN 325 MG/1
1 TABLET AS NEEDED TABLET ORAL
Status: ACTIVE | COMMUNITY

## 2025-06-17 RX ORDER — ASCORBIC ACID 1000 MG
1 TABLET TABLET ORAL ONCE A DAY
Status: ACTIVE | COMMUNITY

## 2025-06-17 RX ORDER — PANTOPRAZOLE SODIUM 40 MG/1
1 TABLET 1/2 TO 1 HOUR BEFORE MORNING MEAL TABLET, DELAYED RELEASE ORAL ONCE A DAY
Status: ACTIVE | COMMUNITY

## 2025-06-17 RX ORDER — HYDRALAZINE HYDROCHLORIDE 50 MG/1
1 TABLET WITH FOOD TABLET ORAL TWICE A DAY
Status: ACTIVE | COMMUNITY

## 2025-06-17 RX ORDER — RIMEGEPANT SULFATE 75 MG/75MG
1 TABLET ON THE TONGUE AND ALLOW TO DISSOLVE TABLET, ORALLY DISINTEGRATING ORAL
Status: ACTIVE | COMMUNITY

## 2025-06-17 RX ORDER — ASPIRIN 81 MG/1
1 TABLET TABLET, CHEWABLE ORAL ONCE A DAY
Status: ACTIVE | COMMUNITY

## 2025-06-17 NOTE — HPI-TODAY'S VISIT:
Patient with IBS evaluated due to epigastric/LUQ abdominal pain, dyspepsia, and melena. Had recent VCE for evaluation of iron def anemia. Recently diagnosed with CVID and was started on Hizentra infusions. Refers having upper abdominal pain every 3 days, episodes last 1-3 minutes but are intense. Refers associated epigastric pressure, abdominal distention, and intermittent diarrhea. Has noticed occasional black stool. Denies nausea/vomiting, dysphagia, odynophagia, heartburn, constipation, rectal bleeding, weight loss, fever.

## 2025-06-18 ENCOUNTER — LAB OUTSIDE AN ENCOUNTER (OUTPATIENT)
Dept: URBAN - METROPOLITAN AREA CLINIC 68 | Facility: CLINIC | Age: 60
End: 2025-06-18

## 2025-06-18 ENCOUNTER — TELEPHONE ENCOUNTER (OUTPATIENT)
Dept: URBAN - METROPOLITAN AREA CLINIC 68 | Facility: CLINIC | Age: 60
End: 2025-06-18

## 2025-07-01 ENCOUNTER — WEB ENCOUNTER (OUTPATIENT)
Dept: URBAN - METROPOLITAN AREA CLINIC 66 | Facility: CLINIC | Age: 60
End: 2025-07-01

## 2025-07-08 ENCOUNTER — OFFICE VISIT (OUTPATIENT)
Dept: URBAN - METROPOLITAN AREA TELEHEALTH 1 | Facility: TELEHEALTH | Age: 60
End: 2025-07-08
Payer: COMMERCIAL

## 2025-07-08 DIAGNOSIS — K29.30 CHRONIC SUPERFICIAL GASTRITIS WITHOUT BLEEDING: ICD-10-CM

## 2025-07-08 DIAGNOSIS — K58.0 IRRITABLE BOWEL SYNDROME WITH DIARRHEA: ICD-10-CM

## 2025-07-08 DIAGNOSIS — D50.9 IRON DEFICIENCY ANEMIA, UNSPECIFIED: ICD-10-CM

## 2025-07-08 DIAGNOSIS — R10.13 DYSPEPSIA: ICD-10-CM

## 2025-07-08 DIAGNOSIS — Z86.0100 PERSONAL HISTORY OF COLON POLYPS, UNSPECIFIED: ICD-10-CM

## 2025-07-08 DIAGNOSIS — R10.84 GENERALIZED ABDOMINAL PAIN: ICD-10-CM

## 2025-07-08 DIAGNOSIS — K63.89 SMALL INTESTINAL BACTERIAL OVERGROWTH (SIBO): ICD-10-CM

## 2025-07-08 PROCEDURE — 99214 OFFICE O/P EST MOD 30 MIN: CPT

## 2025-07-08 RX ORDER — RIMEGEPANT SULFATE 75 MG/75MG
1 TABLET ON THE TONGUE AND ALLOW TO DISSOLVE TABLET, ORALLY DISINTEGRATING ORAL
Status: ACTIVE | COMMUNITY

## 2025-07-08 RX ORDER — ALBUTEROL SULFATE AND BUDESONIDE 90; 80 UG/1; UG/1
2 PUFFS AS NEEDED AEROSOL, METERED RESPIRATORY (INHALATION)
Status: ACTIVE | COMMUNITY

## 2025-07-08 RX ORDER — ROSUVASTATIN CALCIUM 10 MG/1
1 TABLET TABLET, COATED ORAL ONCE A DAY
Status: ACTIVE | COMMUNITY

## 2025-07-08 RX ORDER — BUDESONIDE, GLYCOPYRROLATE, AND FORMOTEROL FUMARATE 160; 9; 4.8 UG/1; UG/1; UG/1
2 PUFFS AEROSOL, METERED RESPIRATORY (INHALATION) TWICE A DAY
Status: ACTIVE | COMMUNITY

## 2025-07-08 RX ORDER — CETIRIZINE HYDROCHLORIDE 10 MG/1
1 TABLET TABLET, FILM COATED ORAL ONCE A DAY
Status: ACTIVE | COMMUNITY

## 2025-07-08 RX ORDER — ASCORBIC ACID 1000 MG
1 TABLET TABLET ORAL ONCE A DAY
Status: ACTIVE | COMMUNITY

## 2025-07-08 RX ORDER — HYDROCHLOROTHIAZIDE 12.5 MG/1
1 CAPSULE IN THE MORNING CAPSULE, GELATIN COATED ORAL ONCE A DAY
Status: ACTIVE | COMMUNITY

## 2025-07-08 RX ORDER — TRAZODONE HYDROCHLORIDE 50 MG/1
1 TABLET AT BEDTIME AS NEEDED TABLET ORAL ONCE A DAY
Status: ACTIVE | COMMUNITY

## 2025-07-08 RX ORDER — ALPRAZOLAM 0.5 MG/1
1 TABLET TABLET ORAL TWICE A DAY
Status: ACTIVE | COMMUNITY

## 2025-07-08 RX ORDER — AZELASTINE HYDROCHLORIDE 137 UG/1
2 PUFFS (1 SPRAY IN EACH NOSTRIL) SPRAY, METERED NASAL TWICE A DAY
Status: ACTIVE | COMMUNITY

## 2025-07-08 RX ORDER — NITROGLYCERIN 0.4 MG/1
1 TABLET UNDER THE TONGUE AND ALLOW TO DISSOLVE AS NEEDED. TAKE EVERY 5 MINUTES UP TO 3 TIMES IF CHEST PAIN PERSISTS TABLET SUBLINGUAL THREE TIMES A DAY
Status: ACTIVE | COMMUNITY

## 2025-07-08 RX ORDER — HYDRALAZINE HYDROCHLORIDE 50 MG/1
1 TABLET WITH FOOD TABLET ORAL TWICE A DAY
Status: ACTIVE | COMMUNITY

## 2025-07-08 RX ORDER — ASPIRIN 81 MG/1
1 TABLET TABLET, CHEWABLE ORAL ONCE A DAY
Status: ACTIVE | COMMUNITY

## 2025-07-08 RX ORDER — ACETAMINOPHEN 325 MG/1
1 TABLET AS NEEDED TABLET ORAL
Status: ACTIVE | COMMUNITY

## 2025-07-08 RX ORDER — RIFAXIMIN 550 MG/1
1 TABLET TABLET ORAL THREE TIMES A DAY
Qty: 42 TABLET | Refills: 1 | Status: ACTIVE | COMMUNITY
Start: 2025-06-17 | End: 2025-07-15

## 2025-07-08 RX ORDER — ONDANSETRON 4 MG/1
1 TABLET ON THE TONGUE AND ALLOW TO DISSOLVE TABLET, ORALLY DISINTEGRATING ORAL ONCE A DAY
Status: ACTIVE | COMMUNITY

## 2025-07-08 RX ORDER — PANTOPRAZOLE SODIUM 40 MG/1
1 TABLET 1/2 TO 1 HOUR BEFORE MORNING MEAL TABLET, DELAYED RELEASE ORAL ONCE A DAY
Status: ACTIVE | COMMUNITY

## 2025-07-08 RX ORDER — BENZONATATE 200 MG/1
1 CAPSULE AS NEEDED CAPSULE ORAL THREE TIMES A DAY
Status: ACTIVE | COMMUNITY

## 2025-07-08 RX ORDER — HUMAN IMMUNOGLOBULIN G 0.2 G/ML
AS DIRECTED LIQUID SUBCUTANEOUS
Status: ACTIVE | COMMUNITY

## 2025-07-08 RX ORDER — PREDNISONE 5 MG/1
1 TABLET WITH FOOD OR MILK TABLET ORAL ONCE A DAY
Status: ACTIVE | COMMUNITY

## 2025-07-08 RX ORDER — CLONIDINE HYDROCHLORIDE 0.1 MG/1
1 TABLET TABLET ORAL ONCE A DAY
Status: ACTIVE | COMMUNITY

## 2025-07-08 RX ORDER — VALSARTAN 80 MG/1
1 TABLET TABLET, FILM COATED ORAL ONCE A DAY
Status: ACTIVE | COMMUNITY

## 2025-07-08 RX ORDER — MONTELUKAST 10 MG/1
1 TABLET TABLET, FILM COATED ORAL ONCE A DAY
Status: ACTIVE | COMMUNITY

## 2025-07-08 NOTE — HPI-TODAY'S VISIT:
Patient with iron def anemia and IBS-D presents via telehealth for follow-up sp CT scan. Refers continues with epigastric/LUQ abdominal pain since 4/2025 and has upcoming EGD. Refers having upper abdominal pain every 3 days, episodes last 1-3 minutes but are intense. Refers associated epigastric pressure, abdominal distention, and intermittent diarrhea. Has noticed occasional black stool. Had HBT consistent with SIBO, currently on day 5 of Xifaxan tx and has not noticed any changes. Refers has history of hemorrhoids with intermittent rectal bleeding, unchanged since last colonoscopy 5/2024. Denies nausea/vomiting, dysphagia, odynophagia, heartburn, constipation, weight loss, fever.

## 2025-07-11 ENCOUNTER — OFFICE VISIT (OUTPATIENT)
Dept: URBAN - METROPOLITAN AREA MEDICAL CENTER 21 | Facility: MEDICAL CENTER | Age: 60
End: 2025-07-11
Payer: COMMERCIAL

## 2025-07-11 ENCOUNTER — TELEPHONE ENCOUNTER (OUTPATIENT)
Dept: URBAN - METROPOLITAN AREA CLINIC 68 | Facility: CLINIC | Age: 60
End: 2025-07-11

## 2025-07-11 DIAGNOSIS — K29.60 OTHER GASTRITIS WITHOUT BLEEDING: ICD-10-CM

## 2025-07-11 DIAGNOSIS — R10.13 EPIGASTRIC PAIN: ICD-10-CM

## 2025-07-11 PROCEDURE — 43239 EGD BIOPSY SINGLE/MULTIPLE: CPT | Performed by: INTERNAL MEDICINE

## 2025-07-11 RX ORDER — PREDNISONE 5 MG/1
1 TABLET WITH FOOD OR MILK TABLET ORAL ONCE A DAY
Status: ACTIVE | COMMUNITY

## 2025-07-11 RX ORDER — CLONIDINE HYDROCHLORIDE 0.1 MG/1
1 TABLET TABLET ORAL ONCE A DAY
Status: ACTIVE | COMMUNITY

## 2025-07-11 RX ORDER — HUMAN IMMUNOGLOBULIN G 0.2 G/ML
AS DIRECTED LIQUID SUBCUTANEOUS
Status: ACTIVE | COMMUNITY

## 2025-07-11 RX ORDER — ONDANSETRON 4 MG/1
1 TABLET ON THE TONGUE AND ALLOW TO DISSOLVE TABLET, ORALLY DISINTEGRATING ORAL ONCE A DAY
Status: ACTIVE | COMMUNITY

## 2025-07-11 RX ORDER — HYDRALAZINE HYDROCHLORIDE 50 MG/1
1 TABLET WITH FOOD TABLET ORAL TWICE A DAY
Status: ACTIVE | COMMUNITY

## 2025-07-11 RX ORDER — AZELASTINE HYDROCHLORIDE 137 UG/1
2 PUFFS (1 SPRAY IN EACH NOSTRIL) SPRAY, METERED NASAL TWICE A DAY
Status: ACTIVE | COMMUNITY

## 2025-07-11 RX ORDER — HYDROCHLOROTHIAZIDE 12.5 MG/1
1 CAPSULE IN THE MORNING CAPSULE, GELATIN COATED ORAL ONCE A DAY
Status: ACTIVE | COMMUNITY

## 2025-07-11 RX ORDER — CETIRIZINE HYDROCHLORIDE 10 MG/1
1 TABLET TABLET, FILM COATED ORAL ONCE A DAY
Status: ACTIVE | COMMUNITY

## 2025-07-11 RX ORDER — ACETAMINOPHEN 325 MG/1
1 TABLET AS NEEDED TABLET ORAL
Status: ACTIVE | COMMUNITY

## 2025-07-11 RX ORDER — RIMEGEPANT SULFATE 75 MG/75MG
1 TABLET ON THE TONGUE AND ALLOW TO DISSOLVE TABLET, ORALLY DISINTEGRATING ORAL
Status: ACTIVE | COMMUNITY

## 2025-07-11 RX ORDER — BUDESONIDE, GLYCOPYRROLATE, AND FORMOTEROL FUMARATE 160; 9; 4.8 UG/1; UG/1; UG/1
2 PUFFS AEROSOL, METERED RESPIRATORY (INHALATION) TWICE A DAY
Status: ACTIVE | COMMUNITY

## 2025-07-11 RX ORDER — PANTOPRAZOLE SODIUM 40 MG/1
1 TABLET 1/2 TO 1 HOUR BEFORE MORNING MEAL TABLET, DELAYED RELEASE ORAL ONCE A DAY
Status: ACTIVE | COMMUNITY

## 2025-07-11 RX ORDER — ROSUVASTATIN CALCIUM 10 MG/1
1 TABLET TABLET, COATED ORAL ONCE A DAY
Status: ACTIVE | COMMUNITY

## 2025-07-11 RX ORDER — TRAZODONE HYDROCHLORIDE 50 MG/1
1 TABLET AT BEDTIME AS NEEDED TABLET ORAL ONCE A DAY
Status: ACTIVE | COMMUNITY

## 2025-07-11 RX ORDER — ASPIRIN 81 MG/1
1 TABLET TABLET, CHEWABLE ORAL ONCE A DAY
Status: ACTIVE | COMMUNITY

## 2025-07-11 RX ORDER — VALSARTAN 80 MG/1
1 TABLET TABLET, FILM COATED ORAL ONCE A DAY
Status: ACTIVE | COMMUNITY

## 2025-07-11 RX ORDER — ALPRAZOLAM 0.5 MG/1
1 TABLET TABLET ORAL TWICE A DAY
Status: ACTIVE | COMMUNITY

## 2025-07-11 RX ORDER — NITROGLYCERIN 0.4 MG/1
1 TABLET UNDER THE TONGUE AND ALLOW TO DISSOLVE AS NEEDED. TAKE EVERY 5 MINUTES UP TO 3 TIMES IF CHEST PAIN PERSISTS TABLET SUBLINGUAL THREE TIMES A DAY
Status: ACTIVE | COMMUNITY

## 2025-07-11 RX ORDER — RIFAXIMIN 550 MG/1
1 TABLET TABLET ORAL THREE TIMES A DAY
Qty: 42 TABLET | Refills: 1 | Status: ACTIVE | COMMUNITY
Start: 2025-06-17 | End: 2025-07-15

## 2025-07-11 RX ORDER — ASCORBIC ACID 1000 MG
1 TABLET TABLET ORAL ONCE A DAY
Status: ACTIVE | COMMUNITY

## 2025-07-11 RX ORDER — MONTELUKAST 10 MG/1
1 TABLET TABLET, FILM COATED ORAL ONCE A DAY
Status: ACTIVE | COMMUNITY

## 2025-07-11 RX ORDER — ALBUTEROL SULFATE AND BUDESONIDE 90; 80 UG/1; UG/1
2 PUFFS AS NEEDED AEROSOL, METERED RESPIRATORY (INHALATION)
Status: ACTIVE | COMMUNITY

## 2025-07-11 RX ORDER — BENZONATATE 200 MG/1
1 CAPSULE AS NEEDED CAPSULE ORAL THREE TIMES A DAY
Status: ACTIVE | COMMUNITY

## 2025-07-22 ENCOUNTER — OFFICE VISIT (OUTPATIENT)
Dept: URBAN - METROPOLITAN AREA CLINIC 66 | Facility: CLINIC | Age: 60
End: 2025-07-22
Payer: COMMERCIAL

## 2025-07-22 DIAGNOSIS — K29.30 CHRONIC SUPERFICIAL GASTRITIS WITHOUT BLEEDING: ICD-10-CM

## 2025-07-22 DIAGNOSIS — D50.9 ANEMIA: ICD-10-CM

## 2025-07-22 DIAGNOSIS — Z86.0100 PERSONAL HISTORY OF COLON POLYPS, UNSPECIFIED: ICD-10-CM

## 2025-07-22 DIAGNOSIS — R10.13 DYSPEPSIA: ICD-10-CM

## 2025-07-22 DIAGNOSIS — K63.89 SMALL INTESTINAL BACTERIAL OVERGROWTH (SIBO): ICD-10-CM

## 2025-07-22 DIAGNOSIS — K58.2 IRRITABLE BOWEL SYNDROME WITH BOTH CONSTIPATION AND DIARRHEA: ICD-10-CM

## 2025-07-22 PROBLEM — 10743008: Status: ACTIVE | Noted: 2025-07-22

## 2025-07-22 PROCEDURE — 99214 OFFICE O/P EST MOD 30 MIN: CPT

## 2025-07-22 RX ORDER — BENZONATATE 200 MG/1
1 CAPSULE AS NEEDED CAPSULE ORAL THREE TIMES A DAY
Status: ACTIVE | COMMUNITY

## 2025-07-22 RX ORDER — HUMAN IMMUNOGLOBULIN G 0.2 G/ML
AS DIRECTED LIQUID SUBCUTANEOUS
Status: ACTIVE | COMMUNITY

## 2025-07-22 RX ORDER — HYDROCHLOROTHIAZIDE 12.5 MG/1
1 CAPSULE IN THE MORNING CAPSULE, GELATIN COATED ORAL ONCE A DAY
Status: ACTIVE | COMMUNITY

## 2025-07-22 RX ORDER — BUDESONIDE, GLYCOPYRROLATE, AND FORMOTEROL FUMARATE 160; 9; 4.8 UG/1; UG/1; UG/1
2 PUFFS AEROSOL, METERED RESPIRATORY (INHALATION) TWICE A DAY
Status: ACTIVE | COMMUNITY

## 2025-07-22 RX ORDER — ONDANSETRON 4 MG/1
1 TABLET ON THE TONGUE AND ALLOW TO DISSOLVE TABLET, ORALLY DISINTEGRATING ORAL ONCE A DAY
Status: ACTIVE | COMMUNITY

## 2025-07-22 RX ORDER — VALSARTAN 40 MG/1
1 TABLET TABLET, FILM COATED ORAL TWICE A DAY
Status: ACTIVE | COMMUNITY

## 2025-07-22 RX ORDER — ASCORBIC ACID 1000 MG
1 TABLET TABLET ORAL ONCE A DAY
Status: ACTIVE | COMMUNITY

## 2025-07-22 RX ORDER — PANTOPRAZOLE SODIUM 40 MG/1
1 TABLET 1/2 TO 1 HOUR BEFORE MORNING MEAL TABLET, DELAYED RELEASE ORAL ONCE A DAY
Status: ACTIVE | COMMUNITY

## 2025-07-22 RX ORDER — CLONIDINE HYDROCHLORIDE 0.1 MG/1
1 TABLET TABLET ORAL ONCE A DAY
Status: ACTIVE | COMMUNITY

## 2025-07-22 RX ORDER — ALPRAZOLAM 0.5 MG/1
1 TABLET TABLET ORAL TWICE A DAY
Status: ACTIVE | COMMUNITY

## 2025-07-22 RX ORDER — AZELASTINE HYDROCHLORIDE 137 UG/1
2 PUFFS (1 SPRAY IN EACH NOSTRIL) SPRAY, METERED NASAL TWICE A DAY
Status: ACTIVE | COMMUNITY

## 2025-07-22 RX ORDER — ROSUVASTATIN CALCIUM 10 MG/1
1 TABLET TABLET, COATED ORAL ONCE A DAY
Status: ACTIVE | COMMUNITY

## 2025-07-22 RX ORDER — NITROGLYCERIN 0.4 MG/1
1 TABLET UNDER THE TONGUE AND ALLOW TO DISSOLVE AS NEEDED. TAKE EVERY 5 MINUTES UP TO 3 TIMES IF CHEST PAIN PERSISTS TABLET SUBLINGUAL THREE TIMES A DAY
Status: ACTIVE | COMMUNITY

## 2025-07-22 RX ORDER — TRAZODONE HYDROCHLORIDE 50 MG/1
1 TABLET AT BEDTIME AS NEEDED TABLET ORAL ONCE A DAY
Status: ACTIVE | COMMUNITY

## 2025-07-22 RX ORDER — PREDNISONE 5 MG/1
1 TABLET WITH FOOD OR MILK TABLET ORAL ONCE A DAY
Status: ACTIVE | COMMUNITY

## 2025-07-22 RX ORDER — HYDRALAZINE HYDROCHLORIDE 25 MG/1
1 TABLET WITH FOOD TABLET ORAL TWICE A DAY
Status: ACTIVE | COMMUNITY

## 2025-07-22 RX ORDER — ACETAMINOPHEN 325 MG/1
1 TABLET AS NEEDED TABLET ORAL
Status: ACTIVE | COMMUNITY

## 2025-07-22 RX ORDER — ASPIRIN 81 MG/1
1 TABLET TABLET, CHEWABLE ORAL ONCE A DAY
Status: ACTIVE | COMMUNITY

## 2025-07-22 RX ORDER — CETIRIZINE HYDROCHLORIDE 10 MG/1
1 TABLET TABLET, FILM COATED ORAL ONCE A DAY
Status: ACTIVE | COMMUNITY

## 2025-07-22 RX ORDER — ALBUTEROL SULFATE AND BUDESONIDE 90; 80 UG/1; UG/1
2 PUFFS AS NEEDED AEROSOL, METERED RESPIRATORY (INHALATION)
Status: ACTIVE | COMMUNITY

## 2025-07-22 RX ORDER — RIMEGEPANT SULFATE 75 MG/75MG
1 TABLET ON THE TONGUE AND ALLOW TO DISSOLVE TABLET, ORALLY DISINTEGRATING ORAL
Status: ACTIVE | COMMUNITY

## 2025-07-22 NOTE — HPI-TODAY'S VISIT:
Patient with iron def anemia, IBS and SIBO presents for follow-up sp EGD. Nader continues with intermittent dyspepsia since 4/2025 however episodes are occurring less frequently since Xifaxan tx. Nader has been taking Pantoprazole for several years and prior to this was taking Famotidine. Nader continues on daily Miralax with adequate control of constipation. Denies nausea/vomiting, dysphagia, odynophagia, abdominal pain, melena, rectal bleeding, weight loss, fever.

## 2025-07-22 NOTE — PHYSICAL EXAM PSYCH:
normal mood with appropriate affect Benzoyl Peroxide Counseling: Patient counseled that medicine may cause skin irritation and bleach clothing.  In the event of skin irritation, the patient was advised to reduce the amount of the drug applied or use it less frequently.   The patient verbalized understanding of the proper use and possible adverse effects of benzoyl peroxide.  All of the patient's questions and concerns were addressed.

## 2025-08-07 ENCOUNTER — OFFICE VISIT (OUTPATIENT)
Dept: URBAN - METROPOLITAN AREA TELEHEALTH 1 | Facility: TELEHEALTH | Age: 60
End: 2025-08-07
Payer: COMMERCIAL

## 2025-08-07 ENCOUNTER — LAB OUTSIDE AN ENCOUNTER (OUTPATIENT)
Dept: URBAN - METROPOLITAN AREA TELEHEALTH 1 | Facility: TELEHEALTH | Age: 60
End: 2025-08-07

## 2025-08-07 DIAGNOSIS — K58.2 IRRITABLE BOWEL SYNDROME WITH BOTH CONSTIPATION AND DIARRHEA: ICD-10-CM

## 2025-08-07 DIAGNOSIS — K29.30 CHRONIC SUPERFICIAL GASTRITIS WITHOUT BLEEDING: ICD-10-CM

## 2025-08-07 DIAGNOSIS — K21.9 GERD WITHOUT ESOPHAGITIS: ICD-10-CM

## 2025-08-07 DIAGNOSIS — R10.11 RIGHT UPPER QUADRANT ABDOMINAL PAIN: ICD-10-CM

## 2025-08-07 DIAGNOSIS — Z86.0100 PERSONAL HISTORY OF COLON POLYPS, UNSPECIFIED: ICD-10-CM

## 2025-08-07 DIAGNOSIS — D50.9 IRON DEFICIENCY ANEMIA, UNSPECIFIED: ICD-10-CM

## 2025-08-07 PROBLEM — 266435005: Status: ACTIVE | Noted: 2025-08-07

## 2025-08-07 PROBLEM — 301717006: Status: ACTIVE | Noted: 2025-08-07

## 2025-08-07 PROCEDURE — 99214 OFFICE O/P EST MOD 30 MIN: CPT

## 2025-08-07 RX ORDER — ALBUTEROL SULFATE AND BUDESONIDE 90; 80 UG/1; UG/1
2 PUFFS AS NEEDED AEROSOL, METERED RESPIRATORY (INHALATION)
Status: ACTIVE | COMMUNITY

## 2025-08-07 RX ORDER — ASCORBIC ACID 1000 MG
1 TABLET TABLET ORAL ONCE A DAY
Status: ACTIVE | COMMUNITY

## 2025-08-07 RX ORDER — ROSUVASTATIN CALCIUM 10 MG/1
1 TABLET TABLET, COATED ORAL ONCE A DAY
Status: ACTIVE | COMMUNITY

## 2025-08-07 RX ORDER — PREDNISONE 5 MG/1
1 TABLET WITH FOOD OR MILK TABLET ORAL ONCE A DAY
Status: ACTIVE | COMMUNITY

## 2025-08-07 RX ORDER — BUDESONIDE, GLYCOPYRROLATE, AND FORMOTEROL FUMARATE 160; 9; 4.8 UG/1; UG/1; UG/1
2 PUFFS AEROSOL, METERED RESPIRATORY (INHALATION) TWICE A DAY
Status: ACTIVE | COMMUNITY

## 2025-08-07 RX ORDER — AZELASTINE HYDROCHLORIDE 137 UG/1
2 PUFFS (1 SPRAY IN EACH NOSTRIL) SPRAY, METERED NASAL TWICE A DAY
Status: ACTIVE | COMMUNITY

## 2025-08-07 RX ORDER — TRAZODONE HYDROCHLORIDE 50 MG/1
1 TABLET AT BEDTIME AS NEEDED TABLET ORAL ONCE A DAY
Status: ACTIVE | COMMUNITY

## 2025-08-07 RX ORDER — NITROGLYCERIN 0.4 MG/1
1 TABLET UNDER THE TONGUE AND ALLOW TO DISSOLVE AS NEEDED. TAKE EVERY 5 MINUTES UP TO 3 TIMES IF CHEST PAIN PERSISTS TABLET SUBLINGUAL THREE TIMES A DAY
Status: ACTIVE | COMMUNITY

## 2025-08-07 RX ORDER — RIMEGEPANT SULFATE 75 MG/75MG
1 TABLET ON THE TONGUE AND ALLOW TO DISSOLVE TABLET, ORALLY DISINTEGRATING ORAL
Status: ACTIVE | COMMUNITY

## 2025-08-07 RX ORDER — VALSARTAN 40 MG/1
1 TABLET TABLET, FILM COATED ORAL TWICE A DAY
Status: ACTIVE | COMMUNITY

## 2025-08-07 RX ORDER — VONOPRAZAN FUMARATE 13.36 MG/1
1 TABLET TABLET ORAL ONCE A DAY
Qty: 90 TABLET | Refills: 1 | OUTPATIENT
Start: 2025-08-07 | End: 2026-02-03

## 2025-08-07 RX ORDER — PANTOPRAZOLE SODIUM 40 MG/1
1 TABLET 1/2 TO 1 HOUR BEFORE MORNING MEAL TABLET, DELAYED RELEASE ORAL ONCE A DAY
Status: ACTIVE | COMMUNITY

## 2025-08-07 RX ORDER — CLONIDINE HYDROCHLORIDE 0.1 MG/1
1 TABLET TABLET ORAL ONCE A DAY
Status: ACTIVE | COMMUNITY

## 2025-08-07 RX ORDER — BENZONATATE 200 MG/1
1 CAPSULE AS NEEDED CAPSULE ORAL THREE TIMES A DAY
Status: ACTIVE | COMMUNITY

## 2025-08-07 RX ORDER — ONDANSETRON 4 MG/1
1 TABLET ON THE TONGUE AND ALLOW TO DISSOLVE TABLET, ORALLY DISINTEGRATING ORAL ONCE A DAY
Status: ACTIVE | COMMUNITY

## 2025-08-07 RX ORDER — ASPIRIN 81 MG/1
1 TABLET TABLET, CHEWABLE ORAL ONCE A DAY
Status: ACTIVE | COMMUNITY

## 2025-08-07 RX ORDER — CETIRIZINE HYDROCHLORIDE 10 MG/1
1 TABLET TABLET, FILM COATED ORAL ONCE A DAY
Status: ACTIVE | COMMUNITY

## 2025-08-07 RX ORDER — DICYCLOMINE HYDROCHLORIDE 20 MG/1
1 TABLET TABLET ORAL THREE TIMES A DAY
Qty: 270 TABLET | Refills: 0 | OUTPATIENT
Start: 2025-08-07

## 2025-08-07 RX ORDER — ALPRAZOLAM 0.5 MG/1
1 TABLET TABLET ORAL TWICE A DAY
Status: ACTIVE | COMMUNITY

## 2025-08-07 RX ORDER — HUMAN IMMUNOGLOBULIN G 0.2 G/ML
AS DIRECTED LIQUID SUBCUTANEOUS
Status: ACTIVE | COMMUNITY

## 2025-08-07 RX ORDER — HYDROCHLOROTHIAZIDE 12.5 MG/1
1 CAPSULE IN THE MORNING CAPSULE, GELATIN COATED ORAL ONCE A DAY
Status: ACTIVE | COMMUNITY

## 2025-08-07 RX ORDER — ACETAMINOPHEN 325 MG/1
1 TABLET AS NEEDED TABLET ORAL
Status: ACTIVE | COMMUNITY

## 2025-08-07 RX ORDER — HYDRALAZINE HYDROCHLORIDE 25 MG/1
1 TABLET WITH FOOD TABLET ORAL TWICE A DAY
Status: ACTIVE | COMMUNITY

## 2025-08-08 ENCOUNTER — TELEPHONE ENCOUNTER (OUTPATIENT)
Dept: URBAN - METROPOLITAN AREA CLINIC 66 | Facility: CLINIC | Age: 60
End: 2025-08-08